# Patient Record
Sex: FEMALE | Race: BLACK OR AFRICAN AMERICAN | NOT HISPANIC OR LATINO | Employment: FULL TIME | URBAN - METROPOLITAN AREA
[De-identification: names, ages, dates, MRNs, and addresses within clinical notes are randomized per-mention and may not be internally consistent; named-entity substitution may affect disease eponyms.]

---

## 2017-05-30 ENCOUNTER — GENERIC CONVERSION - ENCOUNTER (OUTPATIENT)
Dept: OTHER | Facility: OTHER | Age: 52
End: 2017-05-30

## 2020-04-24 ENCOUNTER — OCCMED (OUTPATIENT)
Dept: URGENT CARE | Age: 55
End: 2020-04-24
Payer: OTHER MISCELLANEOUS

## 2020-04-24 ENCOUNTER — APPOINTMENT (OUTPATIENT)
Dept: RADIOLOGY | Age: 55
End: 2020-04-24
Payer: OTHER MISCELLANEOUS
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
## 2020-04-24 PROCEDURE — 72100 X-RAY EXAM L-S SPINE 2/3 VWS: CPT

## 2020-04-24 PROCEDURE — 73080 X-RAY EXAM OF ELBOW: CPT

## 2020-04-24 PROCEDURE — G0382 LEV 3 HOSP TYPE B ED VISIT: HCPCS | Performed by: PHYSICIAN ASSISTANT

## 2020-04-24 PROCEDURE — 73140 X-RAY EXAM OF FINGER(S): CPT

## 2020-04-24 PROCEDURE — 99283 EMERGENCY DEPT VISIT LOW MDM: CPT | Performed by: PHYSICIAN ASSISTANT

## 2020-05-14 ENCOUNTER — APPOINTMENT (OUTPATIENT)
Dept: URGENT CARE | Age: 55
End: 2020-05-14
Payer: OTHER MISCELLANEOUS

## 2020-05-14 PROCEDURE — 99213 OFFICE O/P EST LOW 20 MIN: CPT | Performed by: PHYSICIAN ASSISTANT

## 2020-05-22 ENCOUNTER — APPOINTMENT (OUTPATIENT)
Dept: URGENT CARE | Age: 55
End: 2020-05-22
Payer: OTHER MISCELLANEOUS

## 2020-05-22 PROCEDURE — 99213 OFFICE O/P EST LOW 20 MIN: CPT | Performed by: PHYSICIAN ASSISTANT

## 2020-05-29 ENCOUNTER — APPOINTMENT (OUTPATIENT)
Dept: URGENT CARE | Age: 55
End: 2020-05-29
Payer: OTHER MISCELLANEOUS

## 2020-05-29 PROCEDURE — 99213 OFFICE O/P EST LOW 20 MIN: CPT | Performed by: PHYSICIAN ASSISTANT

## 2022-02-18 ENCOUNTER — APPOINTMENT (OUTPATIENT)
Dept: LAB | Facility: IMAGING CENTER | Age: 57
End: 2022-02-18
Payer: COMMERCIAL

## 2022-02-18 DIAGNOSIS — B35.1 TINEA UNGUIUM: ICD-10-CM

## 2022-02-18 LAB
ALBUMIN SERPL BCP-MCNC: 3.4 G/DL (ref 3.5–5)
ALP SERPL-CCNC: 71 U/L (ref 46–116)
ALT SERPL W P-5'-P-CCNC: 26 U/L (ref 12–78)
AST SERPL W P-5'-P-CCNC: 21 U/L (ref 5–45)
BILIRUB DIRECT SERPL-MCNC: 0.11 MG/DL (ref 0–0.2)
BILIRUB SERPL-MCNC: 0.82 MG/DL (ref 0.2–1)
PROT SERPL-MCNC: 8.1 G/DL (ref 6.4–8.2)

## 2022-02-18 PROCEDURE — 80076 HEPATIC FUNCTION PANEL: CPT

## 2022-02-18 PROCEDURE — 36415 COLL VENOUS BLD VENIPUNCTURE: CPT

## 2022-05-10 ENCOUNTER — HOSPITAL ENCOUNTER (EMERGENCY)
Facility: HOSPITAL | Age: 57
Discharge: HOME | End: 2022-05-10
Attending: EMERGENCY MEDICINE
Payer: COMMERCIAL

## 2022-05-10 VITALS
HEIGHT: 69 IN | DIASTOLIC BLOOD PRESSURE: 85 MMHG | BODY MASS INDEX: 32.98 KG/M2 | WEIGHT: 222.66 LBS | SYSTOLIC BLOOD PRESSURE: 119 MMHG | TEMPERATURE: 97.9 F | HEART RATE: 76 BPM | OXYGEN SATURATION: 96 % | RESPIRATION RATE: 18 BRPM

## 2022-05-10 DIAGNOSIS — R22.0 LEFT FACIAL SWELLING: Primary | ICD-10-CM

## 2022-05-10 PROCEDURE — 99284 EMERGENCY DEPT VISIT MOD MDM: CPT | Performed by: EMERGENCY MEDICINE

## 2022-05-10 PROCEDURE — 99283 EMERGENCY DEPT VISIT LOW MDM: CPT

## 2022-05-10 RX ORDER — NAPROXEN 500 MG/1
500 TABLET ORAL 2 TIMES DAILY WITH MEALS
Qty: 14 TABLET | Refills: 0 | Status: SHIPPED | OUTPATIENT
Start: 2022-05-10 | End: 2022-07-14 | Stop reason: ALTCHOICE

## 2022-05-10 RX ORDER — NAPROXEN 250 MG/1
500 TABLET ORAL ONCE
Status: COMPLETED | OUTPATIENT
Start: 2022-05-10 | End: 2022-05-10

## 2022-05-10 RX ORDER — PENICILLIN V POTASSIUM 500 MG/1
500 TABLET ORAL 4 TIMES DAILY
Qty: 28 TABLET | Refills: 0 | Status: SHIPPED | OUTPATIENT
Start: 2022-05-10 | End: 2022-05-17

## 2022-05-10 RX ORDER — PENICILLIN V POTASSIUM 250 MG/1
500 TABLET ORAL ONCE
Status: COMPLETED | OUTPATIENT
Start: 2022-05-10 | End: 2022-05-10

## 2022-05-10 RX ADMIN — NAPROXEN SODIUM 500 MG: 250 TABLET ORAL at 01:32

## 2022-05-10 RX ADMIN — PENICILLIN V POTASSIUM 500 MG: 250 TABLET, FILM COATED ORAL at 01:39

## 2022-05-15 NOTE — ED PROVIDER NOTES
Pt Name: Aline Desai  MRN: [id number]  [place] 1965  Age/Sex: 55 y o  female  Date of evaluation: 5/10/2022  PCP: No primary care provider on file  CHIEF COMPLAINT    Chief Complaint   Patient presents with    Facial Swelling     left side facial swelling that started Thursday when a box fell on the pt's face         HPI    Julian Devi presents to the Emergency Department complaining of left facial swelling and tenderness  She was seen and evaluated but since then the pain and swelling have gotten worse  No fever  HPI      Past Medical and Surgical History    History reviewed  No pertinent past medical history  Past Surgical History:   Procedure Laterality Date    HERNIA REPAIR         History reviewed  No pertinent family history  Social History     Tobacco Use    Smoking status: Current Every Day Smoker     Packs/day: 0 50     Types: Cigarettes    Smokeless tobacco: Never Used   Vaping Use    Vaping Use: Never used   Substance Use Topics    Alcohol use: Yes     Comment: occasionally     Drug use: Never           Allergies    No Known Allergies    Home Medications    Prior to Admission medications    Medication Sig Start Date End Date Taking? Authorizing Provider   naproxen (Naprosyn) 500 mg tablet Take 1 tablet (500 mg total) by mouth 2 (two) times a day with meals for 7 days 5/10/22 5/17/22  Efraín Alexandra DO   penicillin V potassium (VEETID) 500 mg tablet Take 1 tablet (500 mg total) by mouth 4 (four) times a day for 7 days 5/10/22 5/17/22  Efraín Alexandra DO           Review of Systems    Review of Systems   Constitutional: Negative for chills and fever  HENT: Positive for dental problem and facial swelling  Negative for ear pain, mouth sores, sinus pressure, sinus pain, sore throat, trouble swallowing and voice change  Eyes: Negative for pain and visual disturbance  Respiratory: Negative for cough and shortness of breath      Cardiovascular: Negative for chest pain and palpitations  Gastrointestinal: Negative for abdominal pain and vomiting  Genitourinary: Negative for dysuria and hematuria  Musculoskeletal: Negative for arthralgias and back pain  Skin: Negative for color change and rash  Neurological: Negative for seizures and syncope  All other systems reviewed and are negative  Physical Exam      ED Triage Vitals [05/10/22 0052]   Temperature Pulse Respirations Blood Pressure SpO2   97 9 °F (36 6 °C) 76 18 119/85 96 %      Temp Source Heart Rate Source Patient Position - Orthostatic VS BP Location FiO2 (%)   Oral Monitor Sitting Right arm --      Pain Score       8               Physical Exam  Vitals and nursing note reviewed  Constitutional:       General: She is not in acute distress  Appearance: She is well-developed  HENT:      Head: Normocephalic and atraumatic  Mouth/Throat:      Dentition: Abnormal dentition  Gingival swelling and dental caries present  Comments: Left mandibular pain and swelling  Eyes:      Conjunctiva/sclera: Conjunctivae normal    Cardiovascular:      Rate and Rhythm: Normal rate and regular rhythm  Heart sounds: No murmur heard  Pulmonary:      Effort: Pulmonary effort is normal  No respiratory distress  Breath sounds: Normal breath sounds  Abdominal:      Palpations: Abdomen is soft  Tenderness: There is no abdominal tenderness  Musculoskeletal:      Cervical back: Neck supple  Skin:     General: Skin is warm and dry  Neurological:      Mental Status: She is alert  Assessment and Plan    Sierra Jones is a 55 y o  female who presents with left sided facial swelling  Physical examination remarkable for same  Differential diagnosis (not completely inclusive) includes trauma vs infectious  Plan will be to rx abx and refer for outpatient follow up     MDM    Diagnostic Results    Labs:Procedure    Procedures      ED Course of Care and Re-Assessments      Medications   penicillin V potassium (VEETID) tablet 500 mg (500 mg Oral Given 5/10/22 0139)   naproxen (NAPROSYN) tablet 500 mg (500 mg Oral Given 5/10/22 0132)           FINAL IMPRESSION    Final diagnoses:   Left facial swelling         DISPOSITION/PLAN    Time reflects when diagnosis was documented in both MDM as applicable and the Disposition within this note     Time User Action Codes Description Comment    5/10/2022  1:24 AM Nguyen Rahman Add [R22 0] Left facial swelling       ED Disposition     ED Disposition   Discharge    Condition   Stable    Date/Time   Tue May 10, 2022  1:24 AM    Comment   Kalie Mcfarland discharge to home/self care  Follow-up Information     Follow up With Specialties Details Why [address] [place] [address] for Oral and Maxillofacial Surgery [facility]  Schedule an appointment as soon as possible for a visit   [address] [facility]            PATIENT REFERRED TO:    [address] [facility]  [address]  Schedule an appointment as soon as possible for a visit         DISCHARGE MEDICATIONS:    Discharge Medication List as of 5/10/2022  1:27 AM      START taking these medications    Details   naproxen (Naprosyn) 500 mg tablet Take 1 tablet (500 mg total) by mouth 2 (two) times a day with meals for 7 days, Starting Tue 5/10/2022, Until Tue 5/17/2022, Normal      penicillin V potassium (VEETID) 500 mg tablet Take 1 tablet (500 mg total) by mouth 4 (four) times a day for 7 days, Starting Tue 5/10/2022, Until Tue 5/17/2022, Normal             No discharge procedures on file           Braxton Davies, [address] [place] [address], DO  05/15/22 8801

## 2022-05-26 ENCOUNTER — APPOINTMENT (EMERGENCY)
Dept: RADIOLOGY | Facility: HOSPITAL | Age: 57
End: 2022-05-26
Payer: OTHER MISCELLANEOUS

## 2022-05-26 ENCOUNTER — HOSPITAL ENCOUNTER (EMERGENCY)
Facility: HOSPITAL | Age: 57
Discharge: HOME | End: 2022-05-26
Attending: EMERGENCY MEDICINE
Payer: OTHER MISCELLANEOUS

## 2022-05-26 VITALS
HEART RATE: 77 BPM | WEIGHT: 221.1 LBS | RESPIRATION RATE: 20 BRPM | BODY MASS INDEX: 32.65 KG/M2 | TEMPERATURE: 98.3 F | SYSTOLIC BLOOD PRESSURE: 149 MMHG | OXYGEN SATURATION: 96 % | DIASTOLIC BLOOD PRESSURE: 94 MMHG

## 2022-05-26 DIAGNOSIS — S89.92XA INJURY OF LEFT KNEE, INITIAL ENCOUNTER: Primary | ICD-10-CM

## 2022-05-26 PROCEDURE — 99284 EMERGENCY DEPT VISIT MOD MDM: CPT

## 2022-05-26 PROCEDURE — 73564 X-RAY EXAM KNEE 4 OR MORE: CPT

## 2022-05-26 PROCEDURE — 99283 EMERGENCY DEPT VISIT LOW MDM: CPT

## 2022-05-26 RX ORDER — NAPROXEN 500 MG/1
500 TABLET ORAL 2 TIMES DAILY WITH MEALS
Qty: 10 TABLET | Refills: 0 | Status: SHIPPED | OUTPATIENT
Start: 2022-05-26 | End: 2023-05-26

## 2022-05-26 NOTE — ED PROVIDER NOTES
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 with a table and couple of chairs, when the box fell onto her left knee, slid down to her left foot dropping her between the box and wall  Reports that she tried to twist a way to get out from under the box, tried to pull early gout, unsuccessfully for few minutes  Reports having knee pain since then  She reports yesterday was swollen and there was bruising of her left knee, she reports some improvement of the swelling today  Pain is worse with weight-bearing, ambulation  She applied ice with no improvement  She denies foot or ankle pain, hip pain  Denies previous injury to her knee  History provided by:  Patient  Leg Pain  Location:  Knee  Injury: yes    Knee location:  L knee  Pain details:     Quality:  Throbbing and dull    Radiates to:  L leg  Chronicity:  New  Dislocation: no    Prior injury to area:  No  Relieved by:  Nothing  Worsened by:  Bearing weight, flexion and activity  Ineffective treatments:  Ice, elevation and rest  Associated symptoms: swelling    Associated symptoms: no back pain, no decreased ROM, no fatigue, no fever, no itching, no muscle weakness, no neck pain, no numbness, no stiffness and no tingling    Risk factors: no frequent fractures, no known bone disorder and no recent illness        Prior to Admission Medications   Prescriptions Last Dose Informant Patient Reported?  Taking?   naproxen (Naprosyn) 500 mg tablet   No No   Sig: Take 1 tablet (500 mg total) by mouth 2 (two) times a day with meals for 7 days      Facility-Administered Medications: None History reviewed  No pertinent past medical history  Past Surgical History:   Procedure Laterality Date    HERNIA REPAIR         History reviewed  No pertinent family history  I have reviewed and agree with the history as documented  E-Cigarette/Vaping    E-Cigarette Use Never User      E-Cigarette/Vaping Substances     Social History     Tobacco Use    Smoking status: Current Every Day Smoker     Packs/day: 0 50     Types: Cigarettes    Smokeless tobacco: Never Used   Vaping Use    Vaping Use: Never used   Substance Use Topics    Alcohol use: Yes     Comment: occasionally     Drug use: Never       Review of Systems   Constitutional: Negative for chills, fatigue and fever  Musculoskeletal: Positive for arthralgias and joint swelling  Negative for back pain, neck pain and stiffness  Skin: Positive for color change  Negative for itching and rash  Neurological: Negative for weakness and numbness  All other systems reviewed and are negative  Physical Exam  Physical Exam  Vitals and nursing note reviewed  Constitutional:       General: She is awake  She is not in acute distress  Appearance: Normal appearance  She is not toxic-appearing or diaphoretic  HENT:      Head: Normocephalic and atraumatic  Jaw: No swelling  Right Ear: External ear normal       Left Ear: External ear normal       Mouth/Throat:      Lips: Pink  Mouth: Mucous membranes are moist    Eyes:      General: Lids are normal  Vision grossly intact  Right eye: No discharge  Left eye: No discharge  Conjunctiva/sclera: Conjunctivae normal    Cardiovascular:      Rate and Rhythm: Normal rate and regular rhythm  Heart sounds: Normal heart sounds  Pulmonary:      Effort: Pulmonary effort is normal  No respiratory distress  Breath sounds: Normal breath sounds  Abdominal:      General: There is no distension  Musculoskeletal:      Cervical back: Neck supple        Right hip: Normal       Left hip: Normal       Right knee: Normal       Left knee: Bony tenderness present  No swelling, effusion, erythema, ecchymosis or crepitus  Decreased range of motion  Tenderness present over the medial joint line and lateral joint line  Right lower leg: Normal       Left lower leg: Normal       Right ankle: Normal       Right Achilles Tendon: Normal       Left ankle: Normal       Left Achilles Tendon: Normal       Right foot: Normal       Left foot: Normal  Normal capillary refill  Normal pulse  Comments: LE sensation, strength intact, DTR intact  Skin:     General: Skin is warm and dry  Capillary Refill: Capillary refill takes less than 2 seconds  Coloration: Skin is not jaundiced or pale  Neurological:      Mental Status: She is alert  Gait: Gait normal    Psychiatric:         Mood and Affect: Mood normal          Behavior: Behavior normal          Thought Content: Thought content normal          Vital Signs  ED Triage Vitals [05/26/22 1545]   Temperature Pulse Respirations Blood Pressure SpO2   98 3 °F (36 8 °C) 77 20 149/94 96 %      Temp Source Heart Rate Source Patient Position - Orthostatic VS BP Location FiO2 (%)   Oral Monitor -- -- --      Pain Score       --           Vitals:    05/26/22 1545   BP: 149/94   Pulse: 77         Visual Acuity      ED Medications  Medications - No data to display    Diagnostic Studies  Results Reviewed     None                 XR knee 4+ views left injury   ED Interpretation by Julio C Thomson PA-C (05/26 1635)   No acute fx or dislocation       Final Result by Jennyfer Parham MD (05/26 1640)      No acute osseous abnormality  Workstation performed: [id number]                    Procedures  Procedures         ED Course  ED Course as of 05/27/22 1901   Thu May 26, 2022   1635 Imaging review done by myself and preliminary results were discussed with the patient and family members    Discussion was had with patient and family members that this read is preliminary and therefore discrepancies between this read and the final read by the radiologist are possible  Patient and family members were informed that any discrepancies found by would be relayed by phone call  SBIRT 20yo+    Flowsheet Row Most Recent Value   SBIRT (23 yo +)    In order to provide better care to our patients, we are screening all of our patients for alcohol and drug use  Would it be okay to ask you these screening questions? No Filed at: 05/26/2022 1550                    MDM  Number of Diagnoses or Management Options  Injury of left knee, initial encounter  Diagnosis management comments: Vital signs stable  Afebrile  No signs of acute infection  Strength, sensation, perfusion intact  X-ray ordered  No acute findings  With a follow-up given  Patient placed in knee immobilizer, given crutches  All imaging and/or lab testing discussed with patient, strict return to ED precautions discussed  Patient recommended to follow up promptly with appropriate outpatient provider  Patient and/or family members verbalizes understanding and agrees with plan  Patient and/or family members were given opportunity to ask questions, all questions were answered at this time  Patient is stable for discharge      Portions of the record may have been created with voice recognition software  Occasional wrong word or "sound a like" substitutions may have occurred due to the inherent limitations of voice recognition software  Read the chart carefully and recognize, using context, where substitutions have occurred            Amount and/or Complexity of Data Reviewed  Tests in the radiology section of CPT®: ordered and reviewed        Disposition  Final diagnoses:   Injury of left knee, initial encounter     Time reflects when diagnosis was documented in both MDM as applicable and the Disposition within this note     Time User Action Codes Description Comment    5/26/2022  4:33 PM Nancy Bertrand Add [F13 [id number]] Injury of left knee, initial encounter       ED Disposition     ED Disposition   Discharge    Condition   Stable    Date/Time   Thu May 26, 2022  4:33 PM    Comment   Sri Scott discharge to home/self care  Follow-up Information     Follow up With Specialties Details Why Contact Info Additional [address] [facility] Schedule an appointment as soon as possible for a visit  For follow up regarding your symptoms [address] [facility] [employer] [facility] [place]  [address]  [address] [place] [address], [address] [facility], [address] [place], [place], [address] [phone]          Discharge Medication List as of 5/26/2022  4:37 PM      START taking these medications    Details   naproxen (EC NAPROSYN) 500 MG EC tablet Take 1 tablet (500 mg total) by mouth 2 (two) times a day with meals, Starting Thu 5/26/2022, Until Fri 5/26/2023, Normal         CONTINUE these medications which have NOT CHANGED    Details   naproxen (Naprosyn) 500 mg tablet Take 1 tablet (500 mg total) by mouth 2 (two) times a day with meals for 7 days, Starting Tue 5/10/2022, Until Tue 5/17/2022, Normal             No discharge procedures on file      PDMP Review     None          ED Provider  Electronically Signed by           Kayla Sethi PA-C  05/27/22 7307

## 2022-05-26 NOTE — DISCHARGE INSTRUCTIONS
Follow up with your companies occupational medicine if necessary  Follow up with orthopedics  Use knee immobilizer and crutches until cleared  Return to ED for new or worsening symptoms as discussed

## 2022-05-27 ENCOUNTER — APPOINTMENT (OUTPATIENT)
Dept: URGENT CARE | Facility: MEDICAL CENTER | Age: 57
End: 2022-05-27
Payer: OTHER MISCELLANEOUS

## 2022-05-27 PROCEDURE — 99213 OFFICE O/P EST LOW 20 MIN: CPT | Performed by: EMERGENCY MEDICINE

## 2022-06-01 ENCOUNTER — APPOINTMENT (OUTPATIENT)
Dept: URGENT CARE | Facility: MEDICAL CENTER | Age: 57
End: 2022-06-01
Payer: OTHER MISCELLANEOUS

## 2022-06-01 PROCEDURE — 99213 OFFICE O/P EST LOW 20 MIN: CPT | Performed by: EMERGENCY MEDICINE

## 2022-06-08 ENCOUNTER — APPOINTMENT (OUTPATIENT)
Dept: URGENT CARE | Facility: MEDICAL CENTER | Age: 57
End: 2022-06-08
Payer: OTHER MISCELLANEOUS

## 2022-06-08 PROCEDURE — 99213 OFFICE O/P EST LOW 20 MIN: CPT | Performed by: EMERGENCY MEDICINE

## 2022-07-13 ENCOUNTER — HOSPITAL ENCOUNTER (EMERGENCY)
Facility: HOSPITAL | Age: 57
Discharge: HOME | End: 2022-07-13
Attending: EMERGENCY MEDICINE
Payer: COMMERCIAL

## 2022-07-13 VITALS
RESPIRATION RATE: 18 BRPM | WEIGHT: 227.96 LBS | BODY MASS INDEX: 33.66 KG/M2 | HEART RATE: 97 BPM | DIASTOLIC BLOOD PRESSURE: 83 MMHG | TEMPERATURE: 99 F | OXYGEN SATURATION: 96 % | SYSTOLIC BLOOD PRESSURE: 124 MMHG

## 2022-07-13 DIAGNOSIS — K04.7 DENTAL ABSCESS: Primary | ICD-10-CM

## 2022-07-13 PROCEDURE — 99283 EMERGENCY DEPT VISIT LOW MDM: CPT

## 2022-07-13 PROCEDURE — 99284 EMERGENCY DEPT VISIT MOD MDM: CPT | Performed by: EMERGENCY MEDICINE

## 2022-07-13 RX ORDER — OXYCODONE HYDROCHLORIDE 5 MG/1
5 TABLET ORAL ONCE
Status: COMPLETED | OUTPATIENT
Start: 2022-07-13 | End: 2022-07-13

## 2022-07-13 RX ORDER — CLINDAMYCIN HYDROCHLORIDE 150 MG/1
450 CAPSULE ORAL EVERY 8 HOURS
Qty: 63 CAPSULE | Refills: 0 | Status: SHIPPED | OUTPATIENT
Start: 2022-07-13 | End: 2022-07-20

## 2022-07-13 RX ORDER — CLINDAMYCIN HYDROCHLORIDE 150 MG/1
450 CAPSULE ORAL ONCE
Status: COMPLETED | OUTPATIENT
Start: 2022-07-13 | End: 2022-07-13

## 2022-07-13 RX ORDER — OXYCODONE HYDROCHLORIDE 5 MG/1
5 TABLET ORAL EVERY 6 HOURS PRN
Qty: 12 TABLET | Refills: 0 | Status: SHIPPED | OUTPATIENT
Start: 2022-07-13 | End: 2022-07-16

## 2022-07-13 RX ORDER — IBUPROFEN 600 MG/1
600 TABLET ORAL EVERY 6 HOURS PRN
Qty: 30 TABLET | Refills: 0 | Status: SHIPPED | OUTPATIENT
Start: 2022-07-13

## 2022-07-13 RX ORDER — ACETAMINOPHEN 325 MG/1
650 TABLET ORAL EVERY 6 HOURS PRN
Qty: 30 TABLET | Refills: 0 | Status: SHIPPED | OUTPATIENT
Start: 2022-07-13

## 2022-07-13 RX ORDER — IBUPROFEN 600 MG/1
600 TABLET ORAL ONCE
Status: COMPLETED | OUTPATIENT
Start: 2022-07-13 | End: 2022-07-13

## 2022-07-13 RX ORDER — ACETAMINOPHEN 325 MG/1
650 TABLET ORAL ONCE
Status: COMPLETED | OUTPATIENT
Start: 2022-07-13 | End: 2022-07-13

## 2022-07-13 RX ADMIN — IBUPROFEN 600 MG: 600 TABLET ORAL at 21:01

## 2022-07-13 RX ADMIN — ACETAMINOPHEN 650 MG: 325 TABLET ORAL at 21:01

## 2022-07-13 RX ADMIN — CLINDAMYCIN HYDROCHLORIDE 450 MG: 150 CAPSULE ORAL at 21:01

## 2022-07-13 RX ADMIN — OXYCODONE HYDROCHLORIDE 5 MG: 5 TABLET ORAL at 21:02

## 2022-07-14 NOTE — ED PROVIDER NOTES
History  Chief Complaint   Patient presents with    Dental Pain     Pt reports dental pain and swelling that started on Monday  Is scheduled to see her doctor tomorrow and is having an extraction on the 22nd  Reports using orajel about 10 minutes ago with no relief  50-year-old female with a history of hypertension presenting for evaluation of dental pain and swelling  Symptoms started 2 days ago  Patient notes a constant pain in her left mandibular jaw with radiation to her ear  Patient denies difficulty swallowing or eating  Has not taken any pain medication at home  Has an appointment with her family doctor tomorrow for evaluation and then has a dental extraction scheduled for the 22nd  Patient states she was given antibiotics about a month or 2 ago for the same area  Patient denies fever and other systemic symptoms  Denies chest pain and shortness of breath  Prior to Admission Medications   Prescriptions Last Dose Informant Patient Reported? Taking?   naproxen (EC NAPROSYN) 500 MG EC tablet   No No   Sig: Take 1 tablet (500 mg total) by mouth 2 (two) times a day with meals   naproxen (Naprosyn) 500 mg tablet   No No   Sig: Take 1 tablet (500 mg total) by mouth 2 (two) times a day with meals for 7 days      Facility-Administered Medications: None       Past Medical History:   Diagnosis Date    Hypertension        Past Surgical History:   Procedure Laterality Date    HERNIA REPAIR      HERNIA REPAIR         History reviewed  No pertinent family history  I have reviewed and agree with the history as documented      E-Cigarette/Vaping    E-Cigarette Use Never User      E-Cigarette/Vaping Substances     Social History     Tobacco Use    Smoking status: Current Every Day Smoker     Packs/day: 0 25     Types: Cigarettes    Smokeless tobacco: Never Used   Vaping Use    Vaping Use: Never used   Substance Use Topics    Alcohol use: Yes     Comment: occasionally     Drug use: Never Review of Systems   Constitutional: Negative for chills and fever  HENT: Positive for dental problem and facial swelling  Negative for congestion, ear pain, rhinorrhea, sore throat, trouble swallowing and voice change  Eyes: Negative for pain, discharge and visual disturbance  Respiratory: Negative for cough and shortness of breath  Cardiovascular: Negative for chest pain, palpitations and leg swelling  Gastrointestinal: Negative for abdominal pain, diarrhea, nausea and vomiting  Musculoskeletal: Negative for arthralgias and myalgias  Skin: Negative for color change and rash  Neurological: Negative for weakness, light-headedness and headaches  Hematological: Does not bruise/bleed easily  Physical Exam  Physical Exam  Vitals and nursing note reviewed  Constitutional:       General: She is not in acute distress  Appearance: Normal appearance  HENT:      Head: Normocephalic and atraumatic  Jaw: Tenderness (left mandibular) and swelling (left mandibular) present  No trismus or malocclusion  Nose: Nose normal       Mouth/Throat:      Mouth: Mucous membranes are moist       Dentition: Abnormal dentition  Dental caries and dental abscesses (left mandibular) present  Tongue: No lesions  Tongue does not deviate from midline  Palate: No mass and lesions  Pharynx: Uvula midline  No pharyngeal swelling, oropharyngeal exudate, posterior oropharyngeal erythema or uvula swelling  Tonsils: No tonsillar exudate or tonsillar abscesses  Comments: No base of tongue elevation  Eyes:      General: No scleral icterus  Extraocular Movements: Extraocular movements intact  Conjunctiva/sclera: Conjunctivae normal       Pupils: Pupils are equal, round, and reactive to light  Cardiovascular:      Rate and Rhythm: Normal rate and regular rhythm  Pulmonary:      Effort: Pulmonary effort is normal  No respiratory distress  Breath sounds:  No wheezing, rhonchi or rales  Abdominal:      General: There is no distension  Palpations: Abdomen is soft  Tenderness: There is no abdominal tenderness  There is no guarding or rebound  Musculoskeletal:         General: No deformity  Cervical back: Neck supple  No rigidity or tenderness  Lymphadenopathy:      Cervical: No cervical adenopathy  Skin:     General: Skin is warm and dry  Findings: No rash  Neurological:      General: No focal deficit present  Mental Status: She is alert and oriented to person, place, and time  Mental status is at baseline  Cranial Nerves: No cranial nerve deficit  Psychiatric:         Mood and Affect: Mood normal          Behavior: Behavior normal          Vital Signs  ED Triage Vitals [07/13/22 2023]   Temperature Pulse Respirations Blood Pressure SpO2   99 °F (37 2 °C) 97 18 124/83 96 %      Temp Source Heart Rate Source Patient Position - Orthostatic VS BP Location FiO2 (%)   Oral Monitor -- -- --      Pain Score       10 - Worst Possible Pain           Vitals:    07/13/22 2023   BP: 124/83   Pulse: 97         Visual Acuity      ED Medications  Medications   clindamycin (CLEOCIN) capsule 450 mg (450 mg Oral Given 7/13/22 2101)   oxyCODONE (ROXICODONE) IR tablet 5 mg (5 mg Oral Given 7/13/22 2102)   acetaminophen (TYLENOL) tablet 650 mg (650 mg Oral Given 7/13/22 2101)   ibuprofen (MOTRIN) tablet 600 mg (600 mg Oral Given 7/13/22 2101)       Diagnostic Studies  Results Reviewed     None                 No orders to display              Procedures  Procedures         ED Course                               SBIRT 20yo+    Flowsheet Row Most Recent Value   SBIRT (23 yo +)    In order to provide better care to our patients, we are screening all of our patients for alcohol and drug use  Would it be okay to ask you these screening questions? Yes Filed at: 07/13/2022 2100   Initial Alcohol Screen: US AUDIT-C     1   How often do you have a drink containing alcohol? 0 Filed at: 07/13/2022 2100   2  How many drinks containing alcohol do you have on a typical day you are drinking? 0 Filed at: 07/13/2022 2100   3a  Male UNDER 65: How often do you have five or more drinks on one occasion? 0 Filed at: 07/13/2022 2100   3b  FEMALE Any Age, or MALE 65+: How often do you have 4 or more drinks on one occassion? 0 Filed at: 07/13/2022 2100   Audit-C Score 0 Filed at: 07/13/2022 2100   ESE: How many times in the past year have you    Used an illegal drug or used a prescription medication for non-medical reasons? Never Filed at: 07/13/2022 2100                    MDM  Number of Diagnoses or Management Options  Dental abscess  Diagnosis management comments: 62-year-old female presenting for evaluation of dental pain and swelling  Physical examination is consistent with dental abscess  No base of tongue elevation to suggest deep space abscess or Reagan's angina  No submandibular fullness or tenderness  No cervical lymphadenopathy  No stridor or difficulty swallowing  Minimal facial swelling is present  Patient given ibuprofen and acetaminophen and counseled on use of these medications at home  Will give short course of oxycodone to use as needed for severe pain  Patient counseled extensively on the use of this medication at home and cautioned against driving, operating heavy machinery, and concomitant use of alcohol with this medication  Will initiate antibiotics with clindamycin given recent treatment penicillin and recurrence of infection  Strongly encouraged follow-up with dentist   Return precautions discussed  Patient is in agreement and understanding of these instructions        Disposition  Final diagnoses:   Dental abscess     Time reflects when diagnosis was documented in both MDM as applicable and the Disposition within this note     Time User Action Codes Description Comment    7/13/2022  8:49 PM Kayla Mclain Add [K04 7] Dental abscess       ED Disposition     ED Disposition   Discharge    Condition   Stable    Date/Time   Wed Jul 13, 2022  8:49 PM    Mohan Tejada discharge to home/self care  Follow-up Information     Follow up With Specialties Details Why Contact Info Additional [address] [facility]  As needed [address] [id number] [address] [facility] [address] [place], [id number], [phone]          Discharge Medication List as of 7/13/2022  8:52 PM      START taking these medications    Details   acetaminophen (TYLENOL) 325 mg tablet Take 2 tablets (650 mg total) by mouth every 6 (six) hours as needed for moderate pain, Starting Wed 7/13/2022, Normal      clindamycin (CLEOCIN) 150 mg capsule Take 3 capsules (450 mg total) by mouth every 8 (eight) hours for 7 days, Starting Wed 7/13/2022, Until Wed 7/20/2022, Normal      ibuprofen (MOTRIN) 600 mg tablet Take 1 tablet (600 mg total) by mouth every 6 (six) hours as needed for mild pain, Starting Wed 7/13/2022, Normal      oxyCODONE (Roxicodone) 5 immediate release tablet Take 1 tablet (5 mg total) by mouth every 6 (six) hours as needed for severe pain for up to 3 days Max Daily Amount: 20 mg, Starting Wed 7/13/2022, Until Sat 7/16/2022 at 2359, Normal         CONTINUE these medications which have NOT CHANGED    Details   naproxen (EC NAPROSYN) 500 MG EC tablet Take 1 tablet (500 mg total) by mouth 2 (two) times a day with meals, Starting Thu 5/26/2022, Until Fri 5/26/2023, Normal      naproxen (Naprosyn) 500 mg tablet Take 1 tablet (500 mg total) by mouth 2 (two) times a day with meals for 7 days, Starting Tue 5/10/2022, Until Tue 5/17/2022, Normal             No discharge procedures on file      PDMP Review       Value Time User    PDMP Reviewed  Yes 7/13/2022  9:12 PM Kishor Jung MD          ED Provider  Electronically Signed by           Emily Curran Maicol Maguire MD  07/13/22 3584

## 2022-09-16 ENCOUNTER — APPOINTMENT (OUTPATIENT)
Dept: RADIOLOGY | Facility: MEDICAL CENTER | Age: 57
End: 2022-09-16
Payer: MEDICARE

## 2022-09-16 ENCOUNTER — OCCMED (OUTPATIENT)
Dept: URGENT CARE | Facility: MEDICAL CENTER | Age: 57
End: 2022-09-16
Payer: OTHER MISCELLANEOUS

## 2022-09-16 DIAGNOSIS — S80.02XA CONTUSION OF LEFT KNEE, INITIAL ENCOUNTER: ICD-10-CM

## 2022-09-16 DIAGNOSIS — S80.01XA CONTUSION OF RIGHT KNEE, INITIAL ENCOUNTER: ICD-10-CM

## 2022-09-16 DIAGNOSIS — S62.102A CLOSED FRACTURE OF LEFT WRIST, INITIAL ENCOUNTER: Primary | ICD-10-CM

## 2022-09-16 DIAGNOSIS — S63.501A SPRAIN OF RIGHT WRIST, INITIAL ENCOUNTER: ICD-10-CM

## 2022-09-16 DIAGNOSIS — S89.90XA KNEE INJURY, UNSPECIFIED LATERALITY, INITIAL ENCOUNTER: ICD-10-CM

## 2022-09-16 DIAGNOSIS — S69.90XA INJURY OF WRIST, UNSPECIFIED LATERALITY, INITIAL ENCOUNTER: ICD-10-CM

## 2022-09-16 PROCEDURE — 73564 X-RAY EXAM KNEE 4 OR MORE: CPT

## 2022-09-16 PROCEDURE — 29125 APPL SHORT ARM SPLINT STATIC: CPT | Performed by: PHYSICIAN ASSISTANT

## 2022-09-16 PROCEDURE — 73110 X-RAY EXAM OF WRIST: CPT

## 2022-09-16 PROCEDURE — 99283 EMERGENCY DEPT VISIT LOW MDM: CPT | Performed by: PHYSICIAN ASSISTANT

## 2022-09-16 PROCEDURE — G0382 LEV 3 HOSP TYPE B ED VISIT: HCPCS | Performed by: PHYSICIAN ASSISTANT

## 2022-09-16 RX ORDER — ACETAMINOPHEN AND CODEINE PHOSPHATE 300; 30 MG/1; MG/1
1 TABLET ORAL EVERY 4 HOURS PRN
Qty: 15 TABLET | Refills: 0 | Status: SHIPPED | OUTPATIENT
Start: 2022-09-16

## 2022-09-21 ENCOUNTER — OFFICE VISIT (OUTPATIENT)
Dept: OBGYN CLINIC | Facility: CLINIC | Age: 57
End: 2022-09-21

## 2022-09-21 VITALS
HEART RATE: 97 BPM | SYSTOLIC BLOOD PRESSURE: 121 MMHG | HEIGHT: 69 IN | DIASTOLIC BLOOD PRESSURE: 84 MMHG | WEIGHT: 234.8 LBS | BODY MASS INDEX: 34.78 KG/M2

## 2022-09-21 DIAGNOSIS — S52.502A CLOSED FRACTURE OF DISTAL END OF LEFT RADIUS, UNSPECIFIED FRACTURE MORPHOLOGY, INITIAL ENCOUNTER: Primary | ICD-10-CM

## 2022-09-21 PROCEDURE — 99204 OFFICE O/P NEW MOD 45 MIN: CPT | Performed by: STUDENT IN AN ORGANIZED HEALTH CARE EDUCATION/TRAINING PROGRAM

## 2022-09-21 NOTE — LETTER
September 21, 2022     Patient: Chema Brito  YOB: 1965  Date of Visit: 9/21/2022      To Whom it May Concern:    [employer] Branchfatmata Howardhty is under my professional care  Chuck Plan was seen in my office on 9/21/2022  Chuck Plan may return to work with limitations No lifting over 5 lb with the left arm  We will re-evaluate her left arm in 4 weeks  If you have any questions or concerns, please don't hesitate to call  Sincerely,          Cathy Puga DO        CC: Ade Elizabeth

## 2022-09-21 NOTE — PROGRESS NOTES
Ortho Sports Medicine New Patient Hand/Wrist Visit     Assesment:   65 y o  female left nondisplaced distal radius fracture     Plan:    The patient's diagnosis and treatment were discussed at length today  We discussed no treatment, non-operative treatment, and operative treatment  Short-arm cast was applied today with padding of all bony prominences  See back in 4 weeks with repeat x-rays  At that time fracture appears to be healing we can DC the cast transition to a removable splint  Provided a work note with 5 lb weight lifting restriction  Conservative treatment:  Short arm cast provided  Cast care instructions provided  Instructed to wear a short-arm cast for 4-6 weeks  Lifting restrictions of 5 lbs  Work note provided  NSAIDs as needed for pain  Follow up in one month for x ray to evaluate healing in cast       Imaging: All imaging from today was reviewed by myself and explained to the patient  Nondisplaced distal radius fracture seen on x-ray  Injection:    No Injection planned at this time  Surgery:     No surgery is recommended at this point, continue with conservative treatment plan as noted  Follow up:    Return in about 4 weeks (around 10/19/2022) for assess healing of distal radius fracture   Chief Complaint   Patient presents with    Left Wrist - Pain       History of Present Illness: The patient is a 65 y o , right hand dominant female whose occupation is , referred to me by their primary care physician, seen in clinic for consultation of left hand/wrist   She had a fall on September 16, 2022  She caught herself made fall and immediately started having pain of the left forearm  Her primary care provider ordered x-rays of the left forearm  X-rays of the left forearm showed a distal radial fracture  A splint was applied and she has been wearing the splint since    She describes the pain as a 10/10 with some radiation to the middle forearm  She describes pain as sharp put and dull  She denies using NSAIDs for pain relief  The pain is mainly located on the posterior aspect of the lateral distal forearm  Pain is improved with rest and a splint  Hand/wrist Surgical History:  None    Past Medical, Social and Family History:  Past Medical History:   Diagnosis Date    Hypertension      Past Surgical History:   Procedure Laterality Date    HERNIA REPAIR      HERNIA REPAIR       No Known Allergies  Current Outpatient Medications on File Prior to Visit   Medication Sig Dispense Refill    acetaminophen-codeine (TYLENOL #3) 300-30 mg per tablet Take 1 tablet by mouth every 4 (four) hours as needed for moderate pain 15 tablet 0    acetaminophen (TYLENOL) 325 mg tablet Take 2 tablets (650 mg total) by mouth every 6 (six) hours as needed for moderate pain (Patient not taking: Reported on 9/21/2022) 30 tablet 0    ibuprofen (MOTRIN) 600 mg tablet Take 1 tablet (600 mg total) by mouth every 6 (six) hours as needed for mild pain (Patient not taking: Reported on 9/21/2022) 30 tablet 0    naproxen (EC NAPROSYN) 500 MG EC tablet Take 1 tablet (500 mg total) by mouth 2 (two) times a day with meals (Patient not taking: Reported on 9/21/2022) 10 tablet 0     No current facility-administered medications on file prior to visit       Social History     Socioeconomic History    Marital status: Single     Spouse name: Not on file    Number of children: Not on file    Years of education: Not on file    Highest education level: Not on file   Occupational History    Not on file   Tobacco Use    Smoking status: Current Every Day Smoker     Packs/day: 0 25     Types: Cigarettes    Smokeless tobacco: Never Used   Vaping Use    Vaping Use: Never used   Substance and Sexual Activity    Alcohol use: Yes     Comment: occasionally     Drug use: Never    Sexual activity: Not on file   Other Topics Concern    Not on file   Social History Narrative    Not on file     Social Determinants of Health     Financial Resource Strain: Not on file   Food Insecurity: Not on file   Transportation Needs: Not on file   Physical Activity: Not on file   Stress: Not on file   Social Connections: Not on file   Intimate Partner Violence: Not on file   Housing Stability: Not on file         I have reviewed the past medical, surgical, social and family history, medications and allergies as documented in the EMR  Review of systems: ROS is negative other than that noted in the HPI  Constitutional: Negative for fatigue and fever  HENT: Negative for sore throat  Respiratory: Negative for shortness of breath  Cardiovascular: Negative for chest pain  Gastrointestinal: Negative for abdominal pain  Endocrine: Negative for cold intolerance and heat intolerance  Genitourinary: Negative for flank pain  Musculoskeletal: Negative for back pain  Skin: Negative for rash  Allergic/Immunologic: Negative for immunocompromised state  Neurological: Negative for dizziness  Psychiatric/Behavioral: Negative for agitation  Physical Exam:    Blood pressure 121/84, pulse 97, height 5' 9" (1 753 m), weight 107 kg (234 lb 12 8 oz)  General/Constitutional: NAD, well developed, well nourished  HENT: Normocephalic, atraumatic  CV: Intact distal pulses, regular rate  Resp: No respiratory distress or labored breathing  Lymphatic: No lymphadenopathy palpated  Neuro: Alert and Oriented x 3, no focal deficits  Psych: Normal mood, normal affect, normal judgement, normal behavior  Skin: Warm, dry, no rashes, no erythema      Wrist/Hand focused exam:               LEFT   ROM:   Impaired range of motion at the wrist due to pain  Able to flex and extend fingers  Able to flex 135 and extend elbow 0  Palpation: Effusion negative     Tenderness Tenderness to distal posterior lateral aspect of the radius  Tender to palpation at the TFCC but ulnar shuck is negative     No tenderness to the scaphoid, carpals, metacarpals, proximal radius, ulna, distal humerus  Instability:  stable         UE NV Exam: +2 Radial pulses   Sensation intact to light touch C5-T1 bilaterally, Radial/median/ulnar nerve motor intact      Cervical ROM is full without pain, numbness or tingling        Hand/Wrist Imaging:    X-rays of the left hand/wrist were reviewed, which demonstrate nondisplaced distal radius fracture  No displaced fractures are appreciated  No disruption of the DRUJ  I have reviewed the radiology report and agree with their impression          Scribe Attestation    I,:  Rex Crabtree PA-C am acting as a scribe while in the presence of the attending physician :       I,:   personally performed the services described in this documentation    as scribed in my presence :

## 2022-09-21 NOTE — LETTER
September 29, 2022     Patient: Griffin Gonzalez  YOB: 1965  Date of Visit: 9/21/2022      To Whom it May Concern:    [employer] is under my professional care  Wanda Mullen was seen in my office on 9/21/2022  Wanda Mullen may return to work with limitations limit amount of time with arm in dependent position  Avoid any weight bearing > 10 lbs with the affected arm and prioritize activities to the non-affected arm       If you have any questions or concerns, please don't hesitate to call  Sincerely,          Ed Colon DO        CC: Ade Jolley

## 2022-09-29 ENCOUNTER — TELEPHONE (OUTPATIENT)
Dept: OBGYN CLINIC | Facility: HOSPITAL | Age: 57
End: 2022-09-29

## 2022-09-29 NOTE — TELEPHONE ENCOUNTER
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 work, she holds a clip board with the affected hand and her fingers swell and she has a hard time holding the clip board  She has good cap refill  Making Left hand turns while driving is a problem, she cannot manuver  Advised that she needs to get the arm elevated periodically at work and flex and extend fingers several times per hour  If this does not help she should call back for cast check  Please advise    Patient will find out from her employer if there is some other duties for her today that will not require her to have her arm dependent for long period of time  She will call back if a different job can be provided,  Can a note with ability for frequent elevation be provided? Please fax note to Leticia Fuchs [phone]

## 2022-09-29 NOTE — TELEPHONE ENCOUNTER
Patient reports when it is cold outside her arm swells and cast hurts also hard to drive  Please return call to discuss   Thanks

## 2022-09-30 NOTE — TELEPHONE ENCOUNTER
Patient aware  She is having problems with intermittent swelling that cast feels too tight yet too loose  Requesting Cast check  Offered appt for today but patient cannot make it  10/5 appt provided  Will call with worsening of symptoms

## 2022-10-05 ENCOUNTER — APPOINTMENT (OUTPATIENT)
Dept: RADIOLOGY | Facility: AMBULARY SURGERY CENTER | Age: 57
End: 2022-10-05
Attending: STUDENT IN AN ORGANIZED HEALTH CARE EDUCATION/TRAINING PROGRAM
Payer: MEDICARE

## 2022-10-05 ENCOUNTER — OFFICE VISIT (OUTPATIENT)
Dept: OBGYN CLINIC | Facility: CLINIC | Age: 57
End: 2022-10-05

## 2022-10-05 VITALS
HEART RATE: 78 BPM | HEIGHT: 69 IN | SYSTOLIC BLOOD PRESSURE: 114 MMHG | WEIGHT: 234 LBS | DIASTOLIC BLOOD PRESSURE: 80 MMHG | BODY MASS INDEX: 34.66 KG/M2

## 2022-10-05 DIAGNOSIS — S52.502A CLOSED FRACTURE OF DISTAL END OF LEFT RADIUS, UNSPECIFIED FRACTURE MORPHOLOGY, INITIAL ENCOUNTER: ICD-10-CM

## 2022-10-05 DIAGNOSIS — S52.502A CLOSED FRACTURE OF DISTAL END OF LEFT RADIUS, UNSPECIFIED FRACTURE MORPHOLOGY, INITIAL ENCOUNTER: Primary | ICD-10-CM

## 2022-10-05 PROCEDURE — 99214 OFFICE O/P EST MOD 30 MIN: CPT | Performed by: STUDENT IN AN ORGANIZED HEALTH CARE EDUCATION/TRAINING PROGRAM

## 2022-10-05 PROCEDURE — 73110 X-RAY EXAM OF WRIST: CPT

## 2022-10-05 RX ORDER — AMLODIPINE BESYLATE 2.5 MG/1
2.5 TABLET ORAL DAILY
COMMUNITY
Start: 2022-01-04 | End: 2023-01-04

## 2022-10-05 NOTE — PROGRESS NOTES
Ortho Sports Medicine New Patient Hand/Wrist Visit     Assesment:   65 y o  female left healing nondisplaced distal radius fracture     Plan:    The patient's diagnosis and treatment were discussed at length today  We discussed no treatment, non-operative treatment, and operative treatment  Patient exam findings are consistent with healing nondisplaced distal radius fracture  Given her increased swelling and discomfort with her cast we did remove it at today's visit and repeated x-rays for re-evaluation of distal radius fracture  Given that the fracture showed appropriate healing and no signs of movement we did perform a new short-arm cast at today's visit  She was instructed on proper cast care guidance  She is instructed to continue use for another 2 weeks at which time we will remove the cast, repeat x-rays and at that time transition to wrist splint  Conservative treatment:  New Short arm cast provided  Cast care instructions provided  Instructed to wear a short-arm cast for 4-6 weeks  Lifting restrictions of 5 lbs  Work note provided  NSAIDs as needed for pain  Imaging: All imaging from today was reviewed by myself and explained to the patient  Healing nondisplaced distal radius fracture seen on x-ray  Injection:    No Injection planned at this time  Surgery:     No surgery is recommended at this point, continue with conservative treatment plan as noted  Follow up: Follow-up in 2 weeks for re-evaluation and x-ray of left wrist 3+ views out of cast         Chief Complaint   Patient presents with    Left Wrist - Pain       History of Present Illness: The patient is a 65 y o , right hand dominant female whose occupation is , presents today for follow-up evaluation left nondisplaced distal radius fracture sustained on 09/16/2022    She states over the last week or so her cast has progressively felt tighter and occasionally cause numbness and tingling in her fingers  She states after her cast was originally placed on it felt lose as if it was going to fall off, but then got tight around her wrist   She denies anyone specific activity causing increased tightness in her wrist   She has attempted elevation and ice to provide her relief, however is nothing improves her symptoms  She states that she has been compliant with proper care of her cast   She denies any new injury or trauma to her wrist         Hand/wrist Surgical History:  None    Past Medical, Social and Family History:  Past Medical History:   Diagnosis Date    Hypertension      Past Surgical History:   Procedure Laterality Date    HERNIA REPAIR      HERNIA REPAIR       No Known Allergies  Current Outpatient Medications on File Prior to Visit   Medication Sig Dispense Refill    acetaminophen-codeine (TYLENOL #3) 300-30 mg per tablet Take 1 tablet by mouth every 4 (four) hours as needed for moderate pain 15 tablet 0    acetaminophen (TYLENOL) 325 mg tablet Take 2 tablets (650 mg total) by mouth every 6 (six) hours as needed for moderate pain (Patient not taking: Reported on 9/21/2022) 30 tablet 0    ibuprofen (MOTRIN) 600 mg tablet Take 1 tablet (600 mg total) by mouth every 6 (six) hours as needed for mild pain (Patient not taking: Reported on 9/21/2022) 30 tablet 0    naproxen (EC NAPROSYN) 500 MG EC tablet Take 1 tablet (500 mg total) by mouth 2 (two) times a day with meals (Patient not taking: Reported on 9/21/2022) 10 tablet 0     No current facility-administered medications on file prior to visit       Social History     Socioeconomic History    Marital status: Single     Spouse name: Not on file    Number of children: Not on file    Years of education: Not on file    Highest education level: Not on file   Occupational History    Not on file   Tobacco Use    Smoking status: Current Every Day Smoker     Packs/day: 0 25     Types: Cigarettes    Smokeless tobacco: Never Used   Vaping Use  Vaping Use: Never used   Substance and Sexual Activity    Alcohol use: Yes     Comment: occasionally     Drug use: Never    Sexual activity: Not on file   Other Topics Concern    Not on file   Social History Narrative    Not on file     Social Determinants of Health     Financial Resource Strain: Not on file   Food Insecurity: Not on file   Transportation Needs: Not on file   Physical Activity: Not on file   Stress: Not on file   Social Connections: Not on file   Intimate Partner Violence: Not on file   Housing Stability: Not on file         I have reviewed the past medical, surgical, social and family history, medications and allergies as documented in the EMR  Review of systems: ROS is negative other than that noted in the HPI  Constitutional: Negative for fatigue and fever  HENT: Negative for sore throat  Respiratory: Negative for shortness of breath  Cardiovascular: Negative for chest pain  Gastrointestinal: Negative for abdominal pain  Endocrine: Negative for cold intolerance and heat intolerance  Genitourinary: Negative for flank pain  Musculoskeletal: Negative for back pain  Skin: Negative for rash  Allergic/Immunologic: Negative for immunocompromised state  Neurological: Negative for dizziness  Psychiatric/Behavioral: Negative for agitation  Physical Exam:    Blood pressure 121/84, pulse 97, height 5' 9" (1 753 m), weight 107 kg (234 lb 12 8 oz)  General/Constitutional: NAD, well developed, well nourished  HENT: Normocephalic, atraumatic  CV: Intact distal pulses, regular rate  Resp: No respiratory distress or labored breathing  Lymphatic: No lymphadenopathy palpated  Neuro: Alert and Oriented x 3, no focal deficits  Psych: Normal mood, normal affect, normal judgement, normal behavior  Skin: Warm, dry, no rashes, no erythema      Wrist/Hand focused exam:              LEFT   ROM:   Impaired range of motion at the wrist due to pain  Able to flex and extend fingers  Able to flex 135 and extend elbow 0  Palpation: Effusion negative     Tenderness Tenderness to distal posterior lateral aspect of the radius  Tender to palpation at the TFCC but ulnar shuck is negative  No tenderness to the scaphoid, carpals, metacarpals, proximal radius, ulna, distal humerus  Instability:  stable         UE NV Exam: +2 Radial pulses   Sensation intact to light touch C5-T1 bilaterally, Radial/median/ulnar nerve motor intact      Cervical ROM is full without pain, numbness or tingling        Hand/Wrist Imaging:    X-rays of the left hand/wrist performed on 10/05/2022 were reviewed, which demonstrate healing nondisplaced distal radius fracture    I do not have a current reading interpretation from radiology        Scribe Attestation    I,:  Floresita Ludwig am acting as a scribe while in the presence of the attending physician :       I,:  Darius Pereyra, DO personally performed the services described in this documentation    as scribed in my presence :

## 2022-10-17 ENCOUNTER — TELEPHONE (OUTPATIENT)
Dept: OBGYN CLINIC | Facility: HOSPITAL | Age: 57
End: 2022-10-17

## 2022-10-17 NOTE — TELEPHONE ENCOUNTER
Caller: Helen Sosa    Doctor: Dr Meghan Young    Reason for call: Patient is asking for a Lyft ride to and from her appt on 10/19 @ 9:45am   Please advise    Call back#: [phone]

## 2022-10-19 ENCOUNTER — OFFICE VISIT (OUTPATIENT)
Dept: OBGYN CLINIC | Facility: CLINIC | Age: 57
End: 2022-10-19
Payer: OTHER MISCELLANEOUS

## 2022-10-19 ENCOUNTER — APPOINTMENT (OUTPATIENT)
Dept: RADIOLOGY | Facility: AMBULARY SURGERY CENTER | Age: 57
End: 2022-10-19
Attending: STUDENT IN AN ORGANIZED HEALTH CARE EDUCATION/TRAINING PROGRAM
Payer: MEDICARE

## 2022-10-19 VITALS
DIASTOLIC BLOOD PRESSURE: 93 MMHG | BODY MASS INDEX: 34.56 KG/M2 | SYSTOLIC BLOOD PRESSURE: 134 MMHG | WEIGHT: 234 LBS | HEART RATE: 77 BPM

## 2022-10-19 DIAGNOSIS — S52.502A CLOSED FRACTURE OF DISTAL END OF LEFT RADIUS, UNSPECIFIED FRACTURE MORPHOLOGY, INITIAL ENCOUNTER: ICD-10-CM

## 2022-10-19 DIAGNOSIS — M25.532 PAIN IN LEFT WRIST: ICD-10-CM

## 2022-10-19 DIAGNOSIS — S52.502A CLOSED FRACTURE OF DISTAL END OF LEFT RADIUS, UNSPECIFIED FRACTURE MORPHOLOGY, INITIAL ENCOUNTER: Primary | ICD-10-CM

## 2022-10-19 PROCEDURE — 73110 X-RAY EXAM OF WRIST: CPT

## 2022-10-19 PROCEDURE — 99213 OFFICE O/P EST LOW 20 MIN: CPT | Performed by: STUDENT IN AN ORGANIZED HEALTH CARE EDUCATION/TRAINING PROGRAM

## 2022-10-19 NOTE — PROGRESS NOTES
Ortho Sports Medicine Follow-up Hand/Wrist Visit     Assesment:   65 y o  female left distal radius fracture, routine healing  Plan:    The patient's diagnosis and treatment were discussed at length today  We discussed no treatment, non-operative treatment, and operative treatment  Her cast was discontinued today  She still has some tenderness to palpation at the distal radius and radiographic evidence of routine healing, however visible fracture line  I placed her in a removable wrist brace at this time in advise continued nonweightbearing to that hand  Regarding her work, I have asked her she can continue to work as her job is  as she is mostly doing standing, holding a clipboard, 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  Advised her to avoid any significant lifting, grasping or grabbing, or push pulling  I provided a hand therapy referral today to begin in 2 weeks time  I also advised her to do some gentle home exercises by doing the alphabet with her wrist   I also advised over-the-counter calcium and vitamin-D supplementation  I will see her back in 4 6 weeks with repeat x-rays for re-evaluation  Conservative treatment:    Ice to hand/wrist 1-2 times daily, for 20 minutes at a time  Wrist brace given today  Told to continue avoiding bearing weight  Advised to take calcium supplementation to help bone healing  Advised to begin gentle wrist motion to improve range of motion  Imaging: All imaging from today was reviewed by myself and explained to the patient  Injection:    No Injection planned at this time  Surgery:     No surgery is recommended at this point, continue with conservative treatment plan as noted  Follow up:    No follow-ups on file  Chief Complaint   Patient presents with   • Left Wrist - Follow-up       History of Present Illness:     The patient is a 65 y o , right hand dominant female whose occupation is , is seen for follow-up of left distal radius fracture  Since her last visit she feels minimal improvement of her symptoms  She was given a short-arm cast at her last visit  The cast was removed today  She states that the pain is produced with flexion/extension of the wrist, supination of the wrist   Since her last visit she explains that she had another fall while she was at work  There was a situation where she was hurrying towards the bathroom and slipped on wet grass and she caught herself from the fall and landed on her hands and feet  She was in the cast at that time and reported no significant change in her pain after the fall  Hand/wrist Surgical History:  None    Past Medical, Social and Family History:  Past Medical History:   Diagnosis Date   • Hypertension      Past Surgical History:   Procedure Laterality Date   • HERNIA REPAIR     • HERNIA REPAIR       No Known Allergies  Current Outpatient Medications on File Prior to Visit   Medication Sig Dispense Refill   • acetaminophen-codeine (TYLENOL #3) 300-30 mg per tablet Take 1 tablet by mouth every 4 (four) hours as needed for moderate pain 15 tablet 0   • amLODIPine (NORVASC) 2 5 mg tablet Take 2 5 mg by mouth daily     • acetaminophen (TYLENOL) 325 mg tablet Take 2 tablets (650 mg total) by mouth every 6 (six) hours as needed for moderate pain (Patient not taking: No sig reported) 30 tablet 0   • ibuprofen (MOTRIN) 600 mg tablet Take 1 tablet (600 mg total) by mouth every 6 (six) hours as needed for mild pain (Patient not taking: No sig reported) 30 tablet 0   • naproxen (EC NAPROSYN) 500 MG EC tablet Take 1 tablet (500 mg total) by mouth 2 (two) times a day with meals (Patient not taking: No sig reported) 10 tablet 0     No current facility-administered medications on file prior to visit       Social History     Socioeconomic History   • Marital status: Single     Spouse name: Not on file   • Number of children: Not on file   • Years of education: Not on file   • Highest education level: Not on file   Occupational History   • Not on file   Tobacco Use   • Smoking status: Current Every Day Smoker     Packs/day: 0 25     Types: Cigarettes   • Smokeless tobacco: Never Used   Vaping Use   • Vaping Use: Never used   Substance and Sexual Activity   • Alcohol use: Yes     Comment: occasionally    • Drug use: Never   • Sexual activity: Not on file   Other Topics Concern   • Not on file   Social History Narrative   • Not on file     Social Determinants of Health     Financial Resource Strain: Not on file   Food Insecurity: Not on file   Transportation Needs: Not on file   Physical Activity: Not on file   Stress: Not on file   Social Connections: Not on file   Intimate Partner Violence: Not on file   Housing Stability: Not on file         I have reviewed the past medical, surgical, social and family history, medications and allergies as documented in the EMR  Review of systems: ROS is negative other than that noted in the HPI  Constitutional: Negative for fatigue and fever  HENT: Negative for sore throat  Respiratory: Negative for shortness of breath  Cardiovascular: Negative for chest pain  Gastrointestinal: Negative for abdominal pain  Endocrine: Negative for cold intolerance and heat intolerance  Genitourinary: Negative for flank pain  Musculoskeletal: Negative for back pain  Skin: Negative for rash  Allergic/Immunologic: Negative for immunocompromised state  Neurological: Negative for dizziness  Psychiatric/Behavioral: Negative for agitation  Physical Exam:    Blood pressure 134/93, pulse 77, weight 106 kg (234 lb)      General/Constitutional: NAD, well developed, well nourished  HENT: Normocephalic, atraumatic  CV: Intact distal pulses, regular rate  Resp: No respiratory distress or labored breathing  Lymphatic: No lymphadenopathy palpated  Neuro: Alert and Oriented x 3, no focal deficits  Psych: Normal mood, normal affect, normal judgement, normal behavior  Skin: Warm, dry, no rashes, no erythema      Wrist/Hand focused exam:                RIGHT LEFT   ROM:   full Limited by pain however gentle flexion extension radial and ulnar deviation as well as pronation supination were performed with mild discomfort  Palpation: Effusion negative negative     Tenderness none distal radius   Instability:  stable stable   Special Tests: Phalen Negative Negative     TFCC grind Negative Negative     Piano key sign Negative Negative     Sandoval not tested not tested   Other:        UE NV Exam: +2 Radial pulses bilaterally  Sensation intact to light touch C5-T1 bilaterally, Radial/median/ulnar nerve motor intact      Bilateral shoulder, elbow, and forearm ROM full, painless with passive ROM, no ttp or crepitance throughout extremities above wrist joint    Cervical ROM is full without pain, numbness or tingling    Negative spurling maneuver bilaterally      Hand/Wrist Imaging:    X-rays of the left hand/wrist were reviewed, which demonstrate routine healing of distal radius fracture  The visit fracture line still visible however the some callus formation and sclerosis at the fracture site  No new fractures are appreciated      Scribe Attestation    I,:   am acting as a scribe while in the presence of the attending physician :       I,:   personally performed the services described in this documentation    as scribed in my presence :

## 2022-10-31 ENCOUNTER — HOSPITAL ENCOUNTER (EMERGENCY)
Facility: HOSPITAL | Age: 57
Discharge: HOME | End: 2022-10-31
Attending: EMERGENCY MEDICINE | Admitting: EMERGENCY MEDICINE

## 2022-10-31 ENCOUNTER — APPOINTMENT (EMERGENCY)
Dept: RADIOLOGY | Facility: HOSPITAL | Age: 57
End: 2022-10-31

## 2022-10-31 ENCOUNTER — APPOINTMENT (EMERGENCY)
Dept: CT IMAGING | Facility: HOSPITAL | Age: 57
End: 2022-10-31

## 2022-10-31 VITALS
DIASTOLIC BLOOD PRESSURE: 71 MMHG | TEMPERATURE: 98.5 F | HEART RATE: 70 BPM | SYSTOLIC BLOOD PRESSURE: 135 MMHG | BODY MASS INDEX: 36.43 KG/M2 | WEIGHT: 246.69 LBS | RESPIRATION RATE: 16 BRPM | OXYGEN SATURATION: 97 %
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
## 2022-10-31 RX ORDER — ACETAMINOPHEN 325 MG/1
650 TABLET ORAL ONCE
Status: COMPLETED | OUTPATIENT
Start: 2022-10-31 | End: 2022-10-31

## 2022-10-31 RX ADMIN — ACETAMINOPHEN 650 MG: 325 TABLET ORAL at 10:36

## 2022-10-31 NOTE — DISCHARGE INSTRUCTIONS
FINDINGS:      FACIAL BONES:   No facial bone fracture identified  Normal alignment of the temporomandibular joints  No lytic or blastic lesion  ORBITS:  Orbital globes, optic nerves, and extraocular muscles appear symmetric and normal  There is no evidence of retrobulbar mass, abscess, or hematoma  Periodontal disease noted     SINUSES:  Mucosal thickening within the inferior recess of the left maxillary sinus  No air-fluid levels  SOFT TISSUES:  Normal      IMPRESSION:     No evidence of facial bone fracture    PARENCHYMA:  No intracranial mass, mass effect or midline shift  No CT signs of acute infarction  No acute parenchymal hemorrhage  VENTRICLES AND EXTRA-AXIAL SPACES:  Normal for the patient's age  VISUALIZED ORBITS AND PARANASAL SINUSES:  Unremarkable  CALVARIUM AND EXTRACRANIAL SOFT TISSUES:  Normal      IMPRESSION:     No acute intracranial abnormality  FINDINGS:     ALIGNMENT:  Normal alignment of the cervical spine  No subluxation  VERTEBRAL BODIES:  No fracture  DEGENERATIVE CHANGES:  Mild multilevel cervical degenerative changes are noted without critical central canal stenosis  PREVERTEBRAL AND PARASPINAL SOFT TISSUES:  Unremarkable  THORACIC INLET:  Normal      IMPRESSION:     No cervical spine fracture or traumatic malalignment

## 2022-10-31 NOTE — Clinical Note
Kiran Negron was seen and treated in our emergency department on 10/31/2022  Diagnosis:     Edson Moura  may return to work on return date  She may return on this date: 11/02/2022         If you have any questions or concerns, please don't hesitate to call        Cora Quijano PA-C    ______________________________           _______________          _______________  Hospital Representative                              Date                                Time

## 2022-10-31 NOTE — ED PROVIDER NOTES
History  Chief Complaint   Patient presents with   • Head Injury     Pt reports tripping and falling this morning and hitting her face on concrete   Pt reports b/l knee pain and finger pain  Pt appears very sleepy but states she just got done working night shift  Pt denies blood thinners and Kezia Chapin is a 65 y o  female w/ no significant PMHx presenting to the ED for an evaluation after falling at work this morning  Pt reports she works as a  and tripped on a sidewalk outside while doing her rounds this morning  Pt reports that she "flew in the air" and landed on the R side of her body  She reports that she purposefully tried to avoid falling on her L arm because she is healing from a radial fracture which she sustained 1 month ago  PT reports she landed on her R knee, R hand, and R side of her face  She reports pain at her R knee, R ring and little finger, R shoulder, R lip, teeth at R upper jaw, L shin, and a generalized headache  She denies losing consciousness, weakness, dizziness, neck pain, back pain, abdominal pain or chest pain after the fall  She was able to help herself up and was able to walk after the fall  She denies taking any blood thinners or any medication for pain  She reports that she sustained the injury to her L radius one month ago when she fell also from tripping on a sidewalk  Prior to Admission Medications   Prescriptions Last Dose Informant Patient Reported?  Taking?   acetaminophen (TYLENOL) 325 mg tablet Not Taking at Unknown time  No No   Sig: Take 2 tablets (650 mg total) by mouth every 6 (six) hours as needed for moderate pain   Patient not taking: No sig reported   acetaminophen-codeine (TYLENOL #3) 300-30 mg per tablet Not Taking at Unknown time  No No   Sig: Take 1 tablet by mouth every 4 (four) hours as needed for moderate pain   Patient not taking: Reported on 10/31/2022   amLODIPine (NORVASC) 2 5 mg tablet 10/31/2022 at Unknown time  Yes Yes   Sig: Take 2 5 mg by mouth daily   ibuprofen (MOTRIN) 600 mg tablet Not Taking at Unknown time  No No   Sig: Take 1 tablet (600 mg total) by mouth every 6 (six) hours as needed for mild pain   Patient not taking: No sig reported   naproxen (EC NAPROSYN) 500 MG EC tablet Not Taking at Unknown time  No No   Sig: Take 1 tablet (500 mg total) by mouth 2 (two) times a day with meals   Patient not taking: No sig reported      Facility-Administered Medications: None       Past Medical History:   Diagnosis Date   • Hypertension        Past Surgical History:   Procedure Laterality Date   • HERNIA REPAIR     • HERNIA REPAIR         History reviewed  No pertinent family history  I have reviewed and agree with the history as documented  E-Cigarette/Vaping   • E-Cigarette Use Never User      E-Cigarette/Vaping Substances   • Nicotine No    • THC No    • CBD No    • Flavoring No    • Other No      Social History     Tobacco Use   • Smoking status: Current Every Day Smoker     Packs/day: 0 25     Types: Cigarettes   • Smokeless tobacco: Never Used   Vaping Use   • Vaping Use: Never used   Substance Use Topics   • Alcohol use: Yes     Comment: occasionally    • Drug use: Never       Review of Systems   Constitutional: Negative for activity change, chills and fever  HENT: Negative for congestion, ear pain and sore throat  Eyes: Negative for photophobia, pain and visual disturbance  Respiratory: Negative for cough and shortness of breath  Cardiovascular: Negative for chest pain, palpitations and leg swelling  Gastrointestinal: Negative for abdominal distention, abdominal pain, constipation, diarrhea, nausea and vomiting  Genitourinary: Negative for difficulty urinating, dysuria and hematuria  Musculoskeletal: Negative for arthralgias, back pain, gait problem and neck pain  R knee, R hand, and R facial pain   Skin: Negative for color change and rash  Neurological: Positive for headaches   Negative for seizures and syncope  All other systems reviewed and are negative  Physical Exam  Physical Exam  Vitals and nursing note reviewed  Constitutional:       General: She is not in acute distress  Appearance: She is well-developed  She is not ill-appearing or toxic-appearing  HENT:      Head: Normocephalic  Abrasion (R cheek ) present  No raccoon eyes, Monahan's sign, right periorbital erythema, left periorbital erythema or laceration  Jaw: There is normal jaw occlusion  No tenderness or pain on movement  Right Ear: Tympanic membrane normal       Left Ear: Tympanic membrane normal       Nose: Nose normal       Mouth/Throat:      Mouth: Mucous membranes are moist       Pharynx: No oropharyngeal exudate or posterior oropharyngeal erythema  Eyes:      Extraocular Movements: Extraocular movements intact  Conjunctiva/sclera: Conjunctivae normal       Pupils: Pupils are equal, round, and reactive to light  Cardiovascular:      Rate and Rhythm: Normal rate and regular rhythm  Heart sounds: No murmur heard  Pulmonary:      Effort: Pulmonary effort is normal  No respiratory distress  Breath sounds: Normal breath sounds  No wheezing or rales  Abdominal:      General: Bowel sounds are normal       Palpations: Abdomen is soft  Tenderness: There is no abdominal tenderness  Musculoskeletal:      Right shoulder: Bony tenderness (mild) present  Normal range of motion  Left shoulder: Normal       Right upper arm: Normal       Left upper arm: Normal       Right elbow: Normal       Left elbow: Normal       Right forearm: Normal       Left forearm: Tenderness (secondary to previous radial fracture) present  Right wrist: Normal       Left wrist: Normal       Right hand: Tenderness (over entire 4th and 5th digits) present  Decreased range of motion  Decreased strength (secondary to pain)  Normal sensation  Normal pulse  Left hand: Normal strength  Normal sensation   Normal pulse       Cervical back: Normal range of motion and neck supple  No tenderness  Thoracic back: No tenderness  Lumbar back: No tenderness  Right knee: Bony tenderness (medial ) present  No swelling  Decreased range of motion (secondary to pain)  Right lower leg: Normal  No edema  Left lower leg: Tenderness (anterior tibia) present  No lacerations  No edema  Skin:     General: Skin is warm and dry  Neurological:      Mental Status: She is alert  Vital Signs  ED Triage Vitals   Temperature Pulse Respirations Blood Pressure SpO2   10/31/22 0930 10/31/22 0930 10/31/22 0930 10/31/22 0930 10/31/22 0930   98 5 °F (36 9 °C) 81 16 133/89 98 %      Temp Source Heart Rate Source Patient Position - Orthostatic VS BP Location FiO2 (%)   10/31/22 0930 10/31/22 1154 10/31/22 0930 10/31/22 0930 --   Oral Monitor Sitting Right arm       Pain Score       10/31/22 1036       10 - Worst Possible Pain           Vitals:    10/31/22 0930 10/31/22 1154   BP: 133/89 135/71   Pulse: 81 70   Patient Position - Orthostatic VS: Sitting          Visual Acuity      ED Medications  Medications   acetaminophen (TYLENOL) tablet 650 mg (650 mg Oral Given 10/31/22 1036)       Diagnostic Studies  Results Reviewed     None                 XR tibia fibula 2 views LEFT   ED Interpretation by Kasie Lopez PA-C (10/31 1147)   No acute fractures or abnormalities when read by me  Final Result by Jaun Cage MD (10/31 1148)      No acute osseous abnormality  Workstation performed: [id number]         XR knee 4+ views Right injury   Final Result by Jaun Cage MD (10/31 1148)      No acute osseous abnormality  Workstation performed: [id number]         XR shoulder 2+ views RIGHT   ED Interpretation by Kasie Lopez PA-C (10/31 1146)   No acute fractures or abnormalities when read by me  Final Result by Jaun Cage MD (10/31 0992)      No acute osseous abnormality  Workstation performed: [id number]         XR forearm 2 views LEFT   ED Interpretation by Salinas Rice PA-C (10/31 1145)   No acute fracture when read by me  Final Result by Gregory Ac MD (10/31 1209)      No new fractures of the left forearm            Workstation performed: [id number]         XR hand 3+ vw right   ED Interpretation by Salinas Rice PA-C (10/31 1144)   No acute fracture or abnormality when read by me  Final Result by Davidson Sadler MD (10/31 1142)      No acute osseous abnormality  Workstation performed: [id number]         CT cervical spine without contrast   Final Result by Gregory Ac MD (10/31 1127)      No cervical spine fracture or traumatic malalignment  Workstation performed: [id number]         CT head without contrast   Final Result by Gregory Ac MD (10/31 1120)      No acute intracranial abnormality  Workstation performed: [id number]         CT facial bones without contrast   Final Result by Gregory Ac MD (10/31 1123)      No evidence of facial bone fracture               Workstation performed: [id number]                    Procedures  Procedures         ED Course  ED Course as of 10/31/22 1515   Mon Oct 31, 2022   1130 No cervical spine fracture or traumatic malalignment  1130 No evidence of facial bone fracture   1130 No acute intracranial abnormality  SBIRT 20yo+    Flowsheet Row Most Recent Value   SBIRT (23 yo +)    In order to provide better care to our patients, we are screening all of our patients for alcohol and drug use  Would it be okay to ask you these screening questions?  No Filed at: 10/31/2022 3653                    MDM  Number of Diagnoses or Management Options  Fall, initial encounter: new and requires workup  Diagnosis management comments: Anel Daigle is a 65 y o  female w/ no significant PMHx presenting to the ED for an evaluation after falling at work this morning  On exam pt is well appearing and in no acute distress  There is tenderness at L tibia, medial aspect of R knee, R shoulder, R 4th and 5th digits, R zygoma, and over teeth at R upper jaw  There is no signs of significant dental injury  Ordered X-rays at various locations of tenderness, CT head w/o contrast, CT facial bones, and CT C-spine  Ordered Tylenol  Pt understanding and agreeable with plan  Imaging at head and neck reveal no cervical spine fracture or traumatic malalignment, no evidence of facial bone fracture, and no clear intracranial abnormality  XR of R hand, R shoulder, L forearm, L tibia fibula, and R knee reveal no acute osseous abnormality when read by me  Upon re-examination patient is sleeping comfortably in bed  Mother reports that pt works night shift so she is feeling tired  Extensivelly discussed all imaging results with patient and mother  Pt reports her headache resolved with tylenol  Advised pt continues to follow with orthopedics for her L arm  Advised follow up with PCP  Advised strict return precautions to ED  Amount and/or Complexity of Data Reviewed  Tests in the radiology section of CPT®: reviewed and ordered  Independent visualization of images, tracings, or specimens: yes    Patient Progress  Patient progress: improved      Disposition  Final diagnoses:   Fall, initial encounter     Time reflects when diagnosis was documented in both MDM as applicable and the Disposition within this note     Time User Action Codes Description Comment    10/31/2022 11:55 AM Severiano Vega, initial encounter       ED Disposition     ED Disposition   Discharge    Condition   Stable    Date/Time   Mon Oct 31, 2022 11:55 AM    Paul Ly discharge to home/self care                 Follow-up Information     Follow up With Specialties Details Why Contact Info Additional [address] [facility] Emergency Department Emergency Medicine  If symptoms worsen [facility] [id number]  [address] [facility] Emergency Department, [address] [place] [address], [facility]  In 1 day  [address] [facility]  [phone]           Discharge Medication List as of 10/31/2022 12:04 PM      CONTINUE these medications which have NOT CHANGED    Details   amLODIPine (NORVASC) 2 5 mg tablet Take 2 5 mg by mouth daily, Starting Tue 1/4/2022, Until Wed 1/4/2023, Historical Med      acetaminophen (TYLENOL) 325 mg tablet Take 2 tablets (650 mg total) by mouth every 6 (six) hours as needed for moderate pain, Starting Wed 7/13/2022, Normal      acetaminophen-codeine (TYLENOL #3) 300-30 mg per tablet Take 1 tablet by mouth every 4 (four) hours as needed for moderate pain, Starting Fri 9/16/2022, Normal      ibuprofen (MOTRIN) 600 mg tablet Take 1 tablet (600 mg total) by mouth every 6 (six) hours as needed for mild pain, Starting Wed 7/13/2022, Normal      naproxen (EC NAPROSYN) 500 MG EC tablet Take 1 tablet (500 mg total) by mouth 2 (two) times a day with meals, Starting Thu 5/26/2022, Until Fri 5/26/2023, Normal             No discharge procedures on file      PDMP Review       Value Time User    PDMP Reviewed  Yes 9/16/2022 12:34 PM Marysue Kussmaul Pica, PA-C          ED Provider  Electronically Signed by           Radha Carrillo PA-C  10/31/22 8869

## 2022-11-02 ENCOUNTER — EVALUATION (OUTPATIENT)
Dept: PHYSICAL THERAPY | Facility: MEDICAL CENTER | Age: 57
End: 2022-11-02
Attending: STUDENT IN AN ORGANIZED HEALTH CARE EDUCATION/TRAINING PROGRAM

## 2022-11-02 DIAGNOSIS — S52.502A CLOSED FRACTURE OF DISTAL END OF LEFT RADIUS, UNSPECIFIED FRACTURE MORPHOLOGY, INITIAL ENCOUNTER: Primary | ICD-10-CM

## 2022-11-02 NOTE — PROGRESS NOTES
PT Evaluation     Today's date: 2022  Patient name: Angelica Blevins  : 1965  MRN: [id number]  Referring provider: Yesy Brown DO  Dx:   Encounter Diagnosis     ICD-10-CM    1  Closed fracture of distal end of left radius, unspecified fracture morphology, initial encounter  S57 329S Ambulatory Referral to PT/OT Hand Therapy                  Assessment  Assessment details: Pt is a 65year old RHD female who presents to PT following L DRF from a fall sustained on 22  Pt presents with prominent ulnar styloid and + ulnar variance from xray observation  No tenderness to distal radius, more along ulnar column  Pt has limited ROM in all directions, improved ranges with volar glide to distal ulna possibly correcting malalignment  She has full comp flexion of digits, moderate lag in thumb flexion, full PROM  Pt should benefit from skilled PT to help reduce pain, improve ROM, improve strength when appropriate, and better her overall functioning to perform her work duties without restrictions   Thank you kindly for your referral   Impairments: abnormal or restricted ROM, activity intolerance, impaired physical strength, lacks appropriate home exercise program and pain with function  Understanding of Dx/Px/POC: good   Prognosis: good    Goals  STG (2-3 weeks)  1: Pt independent in HEP  2: improve AROM 10-15 degrees  3: full AROM thumb    LTG (4-6 weeks)  1: Improve FOTO 10-15 pts  2: wrist AROM WFL  3: wrist PROM WNL  4: full FA rotation  5: decrease pain 2 grades on VAS  6: wrist stabilizers 4+ to 5/5  7:  strength within 20 lbs of uninvolved side      Plan  Plan details: Initiate PT as per POC  Patient would benefit from: skilled physical therapy  Planned modality interventions: thermotherapy: hydrocollator packs and cryotherapy  Planned therapy interventions: joint mobilization, manual therapy, massage, neuromuscular re-education, patient education, stretching, therapeutic activities, therapeutic exercise, fine motor coordination training, flexibility, functional ROM exercises and home exercise program  Frequency: 2x week  Duration in visits: 12  Duration in weeks: 6  Plan of Care beginning date: 2022  Plan of Care expiration date: 2022  Treatment plan discussed with: patient        Subjective Evaluation    History of Present Illness  Date of onset: 2022  Mechanism of injury: trauma  Mechanism of injury: Tripped at work during commotion, sidewalk was abnormal  Went occumed- thought she only sprained it  Casted for 4 weeks  Having some stiffness moving in each direction    No previous injuries to L wrist            Not a recurrent problem   Quality of life: good    Pain  Current pain ratin  At best pain ratin  At worst pain rating: 10  Relieving factors: rest and support  Progression: improved    Social Support    Employment status: working ([employer] and [employer])  Hand dominance: right    Patient Goals  Patient goals for therapy: decreased pain, increased motion, increased strength and independence with ADLs/IADLs          Objective     Active Range of Motion     Left Wrist   Wrist flexion: 12 degrees   Wrist extension: 22 degrees   Radial deviation: 10 degrees   Ulnar deviation: 10 degrees with pain      Right Wrist   Wrist flexion: 50 degrees   Wrist extension: 75 degrees   Radial deviation: 15 degrees   Ulnar deviation: 35 degrees     Additional Active Range of Motion Details  Sup/pron L 65/60; R 80/70  Full comp flexion, mild stiffness at her MCP joints  Poor active flexion of thumb, able to isolate IP flexion full with blocking, no passive restrictions    Passive Range of Motion     Left Wrist   Wrist flexion: 25 degrees   Wrist extension: 35 degrees     Additional Passive Range of Motion Details  End feel soft- more soft tissue restriction  Wrist and FA ROM improved with volar glide to distal ulna, possible malalignment at DRUJ; no significant hypermobility  + ulnar positive variance upon xray- reviewed with Pt             Precautions: DOI 9/16/22  HEP: wrist, forearm, thumb, AA/AROM      Manuals             Wrist AAROM                                                    Neuro Re-Ed             Wrist widget             Grooved pegs                                                                              Ther Ex             Towel bunches             Ball roll for wrist             Pegs grasping             Wrist AROM             FA rotation AROM                                                    Ther Activity                                       Gait Training                                       Modalities             MH             CP

## 2022-11-11 ENCOUNTER — OFFICE VISIT (OUTPATIENT)
Dept: PHYSICAL THERAPY | Facility: MEDICAL CENTER | Age: 57
End: 2022-11-11
Attending: STUDENT IN AN ORGANIZED HEALTH CARE EDUCATION/TRAINING PROGRAM

## 2022-11-11 DIAGNOSIS — S52.502A CLOSED FRACTURE OF DISTAL END OF LEFT RADIUS, UNSPECIFIED FRACTURE MORPHOLOGY, INITIAL ENCOUNTER: Primary | ICD-10-CM

## 2022-11-11 NOTE — PROGRESS NOTES
Daily Note     Today's date: 2022  Patient name: Zohreh Thomas  : 1965  MRN: [id number]  Referring provider: Janna Barker DO  Dx:   Encounter Diagnosis     ICD-10-CM    1  Closed fracture of distal end of left radius, unspecified fracture morphology, initial encounter  S52 502A                   Subjective: Patient reports compliance with home exercises  Objective: See treatment diary below      Assessment: Patient presents with significant left wrist stiffness  Flexion 20 degrees, extension 30 degrees  FA rotation 60/60 degrees  Guarding with manual stretching, had improved tolerance to AROM  No significant soreness reported post treatment session  Patient would benefit from continued PT to address mobility impairments to restore functional use of her left wrist        Plan: Continue per plan of care  Progress treatment as tolerated         Precautions: DOI 22  HEP: wrist, forearm, thumb, AA/AROM      Manuals             Wrist AAROM x10'                                                   Neuro Re-Ed             Wrist widget             Grooved pegs                                                                              Ther Ex             Towel bunches x3'            Ball roll for wrist x3'            Pegs grasping x2'            Wrist AROM Cone x20 ea            FA rotation AROM Wheel x3'                                                   Ther Activity                                       Gait Training                                       Modalities             MH 10'            CP deferred

## 2022-11-15 ENCOUNTER — OFFICE VISIT (OUTPATIENT)
Dept: PHYSICAL THERAPY | Facility: MEDICAL CENTER | Age: 57
End: 2022-11-15
Attending: STUDENT IN AN ORGANIZED HEALTH CARE EDUCATION/TRAINING PROGRAM

## 2022-11-15 DIAGNOSIS — S52.502A CLOSED FRACTURE OF DISTAL END OF LEFT RADIUS, UNSPECIFIED FRACTURE MORPHOLOGY, INITIAL ENCOUNTER: Primary | ICD-10-CM

## 2022-11-15 NOTE — PROGRESS NOTES
Daily Note     Today's date: 11/15/2022  Patient name: Rishi Willingham  : 1965  MRN: [id number]  Referring provider: Jaime Rodríguez DO  Dx:   Encounter Diagnosis     ICD-10-CM    1  Closed fracture of distal end of left radius, unspecified fracture morphology, initial encounter  S52 502A                   Subjective: Pt reports her wrist is doing better overall  Trying to wean out of brace when she can  Objective: See treatment diary below      Assessment: Tolerated treatment well  Patient exhibited good technique with therapeutic exercises and would benefit from continued PT   Wrist AAROM  Flex/ext 50/40, improved ROM  Initiated light strengthening, pt tolerated well  Pt noted she tolerated MH better than CP    Plan: Continue per plan of care        Precautions: DOI 22  HEP: wrist, forearm, thumb, AA/AROM      Manuals 11/11 11/15           Wrist AAROM x10' x10'                                                  Neuro Re-Ed             Wrist widget  NV           Grooved pegs  NV                                                                            Ther Ex             Towel bunches x3'            Ball roll for wrist x3' x3'           Pegs grasping x2' fingerweb red 30x           Wrist AROM Cone x20 ea 2# 2x10           FA rotation AROM Wheel x3' 2# 2x10           flexbar towel twist  yellow 20x           flexbar sup/pron  yellow 20x             20           Ther Activity                                       Gait Training                                       Modalities             MH 10' 10' x2           CP deferred

## 2022-11-18 ENCOUNTER — APPOINTMENT (OUTPATIENT)
Dept: PHYSICAL THERAPY | Facility: MEDICAL CENTER | Age: 57
End: 2022-11-18
Attending: STUDENT IN AN ORGANIZED HEALTH CARE EDUCATION/TRAINING PROGRAM

## 2022-11-22 ENCOUNTER — OFFICE VISIT (OUTPATIENT)
Dept: PHYSICAL THERAPY | Facility: MEDICAL CENTER | Age: 57
End: 2022-11-22
Attending: STUDENT IN AN ORGANIZED HEALTH CARE EDUCATION/TRAINING PROGRAM

## 2022-11-22 DIAGNOSIS — S52.502A CLOSED FRACTURE OF DISTAL END OF LEFT RADIUS, UNSPECIFIED FRACTURE MORPHOLOGY, INITIAL ENCOUNTER: Primary | ICD-10-CM

## 2022-11-22 NOTE — PROGRESS NOTES
Daily Note     Today's date: 2022  Patient name: Sandra Huddleston  : 1965  MRN: [id number]  Referring provider: Harpreet Aguilar DO  Dx:   Encounter Diagnosis     ICD-10-CM    1  Closed fracture of distal end of left radius, unspecified fracture morphology, initial encounter  S59 012C                      Subjective: Pt reports she had soreness for a couple days after last session  Able to drive without brace, able pull up pants easier  Objective: See treatment diary below      Assessment: Tolerated treatment well  Patient exhibited good technique with therapeutic exercises and would benefit from continued PT  Pt has increased stiffness moving into wrist flexion and extension; FA rotation WNL  Advised Pt to continue with her stretching pt admitted to not doing her exercises altogether due to soreness after last session  Lessened program some for better tolerance to light strengthening  Reviewed HEP with Pt, pt in agreement in doing her exercises in between sessions    Plan: Continue per plan of care        Precautions: DOI 22  HEP: wrist, forearm, thumb, AA/AROM      Manuals 11/11 11/15 11/22          Wrist AAROM x10' x10' x10'                                                 Neuro Re-Ed             Wrist widget  NV           Grooved pegs  NV                                                                            Ther Ex             Towel bunches x3'            Ball roll for wrist x3' x3' x3'          Pegs grasping x2' fingerweb red 30x fingerweb red 30x          Wrist AROM Cone x20 ea 2# 2x10 1# 20x          FA rotation AROM Wheel x3' 2# 2x10 1# 20x          flexbar towel twist  yellow 20x  yellow 20x          flexbar sup/pron  yellow 20x  yelow 20x          clothespins   Blue/green 4 min            20 25          Ther Activity                                       Gait Training                                       Modalities              10' 10' x2 10          CP deferred

## 2022-11-29 ENCOUNTER — OFFICE VISIT (OUTPATIENT)
Dept: PHYSICAL THERAPY | Facility: MEDICAL CENTER | Age: 57
End: 2022-11-29
Attending: STUDENT IN AN ORGANIZED HEALTH CARE EDUCATION/TRAINING PROGRAM

## 2022-11-29 DIAGNOSIS — S52.502A CLOSED FRACTURE OF DISTAL END OF LEFT RADIUS, UNSPECIFIED FRACTURE MORPHOLOGY, INITIAL ENCOUNTER: Primary | ICD-10-CM

## 2022-12-02 ENCOUNTER — OFFICE VISIT (OUTPATIENT)
Dept: PHYSICAL THERAPY | Facility: MEDICAL CENTER | Age: 57
End: 2022-12-02
Attending: STUDENT IN AN ORGANIZED HEALTH CARE EDUCATION/TRAINING PROGRAM

## 2022-12-02 DIAGNOSIS — S52.502A CLOSED FRACTURE OF DISTAL END OF LEFT RADIUS, UNSPECIFIED FRACTURE MORPHOLOGY, INITIAL ENCOUNTER: Primary | ICD-10-CM

## 2022-12-02 NOTE — PROGRESS NOTES
Daily Note     Today's date: 2022  Patient name: Jarrett Jean  : 1965  MRN: [id number]  Referring provider: Maria Luisa Acosta DO  Dx:   Encounter Diagnosis     ICD-10-CM    1  Closed fracture of distal end of left radius, unspecified fracture morphology, initial encounter  S52 502A                      Subjective: Patient states she is having difficulty with pulling and twisting motions  She states she is stiff when using her brace  She did drive without her brace and is not wearing her brace to sleep  Objective: See treatment diary below    Assessment: Patient should continue weaning from wrist splint  ROM is progressing well  She has functional ROM in extension and forearm rotation  She remains moderately limited in wrist flexion  Reviewed wrist flexor stretches with patient and provided her with her updated pictures and instructions for home  Added in wrist maze which was challenging for her to isolate wrist motion  She required cueing to minimize proximal movement to compensate for loss of wrist motion  Able to progress resistance for strengthening  Cueing provided to move through available wrist ROM  Added in resisted row to address pulling difficulties  Patient would benefit from continued PT to address mobility and strength impairments to improve tolerance to activity  Plan: Continue per plan of care        Precautions: DOI 22  HEP: wrist, forearm, thumb, AA/AROM      Manuals 11/11 11/15 11/22 11/29 12/2        Wrist AAROM x10' x10' x10' x10' x10'                                               Neuro Re-Ed             Wrist widget  NV   x3'        Grooved pegs  NV                                                                            Ther Ex             Towel bunches x3'            Ball roll for wrist x3' x3' x3' x3' x3'        Pegs grasping x2' fingerweb red 30x fingerweb red 30x fingerweb red 30x Red x30        Wrist AROM Cone x20 ea 2# 2x10 1# 20x 1# 20x 2# 2x10        FA rotation AROM Wheel x3' 2# 2x10 1# 20x 1# 20x 2# 2x10        flexbar towel twist  yellow 20x  yellow 20x yellow 20x Red x20        flexbar sup/pron  yellow 20x  yelow 20x yelow 20x Red x20        clothespins   Blue/green 4 min Blue/green 4 min Blue/ green 4 min        TB row single arm     Red x30          20 25 25 min 30        Ther Activity                                       Gait Training                                       Modalities             MH 10' 10' x2 10 10 10        CP deferred

## 2022-12-06 ENCOUNTER — OFFICE VISIT (OUTPATIENT)
Dept: PHYSICAL THERAPY | Facility: MEDICAL CENTER | Age: 57
End: 2022-12-06
Attending: STUDENT IN AN ORGANIZED HEALTH CARE EDUCATION/TRAINING PROGRAM

## 2022-12-06 DIAGNOSIS — S52.502A CLOSED FRACTURE OF DISTAL END OF LEFT RADIUS, UNSPECIFIED FRACTURE MORPHOLOGY, INITIAL ENCOUNTER: Primary | ICD-10-CM

## 2022-12-06 NOTE — PROGRESS NOTES
Daily Note     Today's date: 2022  Patient name: Milan Maher  : 1965  MRN: [id number]  Referring provider: Carol Garces DO  Dx:   Encounter Diagnosis     ICD-10-CM    1  Closed fracture of distal end of left radius, unspecified fracture morphology, initial encounter  S52 502A                      Subjective:P reported she had some pain after last session but felt okay later on in the day  Has not been wearing her brace, doing okay without it  Objective: See treatment diary below      Assessment: Tolerated treatment well  Patient exhibited good technique with therapeutic exercises and would benefit from continued PT  Pt still has moderate stiffness moving into wrist flexion, reviewed stretching techniques with pt  No complaints of pain post session, just mild fatigue    Plan: Continue per plan of care        Precautions: DOI 22  HEP: wrist, forearm, thumb, AA/AROM      Manuals 11/11 11/15 11/22 11/29 12/2 12/6       Wrist AAROM x10' x10' x10' x10' x10' x10'                                              Neuro Re-Ed             Wrist widget  NV   x3' x3'       Grooved pegs  NV                                                                            Ther Ex             Towel bunches x3'            Ball roll for wrist x3' x3' x3' x3' x3' x3'       Pegs grasping x2' fingerweb red 30x fingerweb red 30x fingerweb red 30x Red x30 green 30x       Wrist AROM Cone x20 ea 2# 2x10 1# 20x 1# 20x 2# 2x10 2# 2x10       FA rotation AROM Wheel x3' 2# 2x10 1# 20x 1# 20x 2# 2x10 2# 2x10       flexbar towel twist  yellow 20x  yellow 20x yellow 20x Red x20 Red x20       flexbar sup/pron  yellow 20x  yelow 20x yelow 20x Red x20 Red x20       clothespins   Blue/green 4 min Blue/green 4 min Blue/ green 4 min blue/green 4 min       TB row single arm     Red x30 Red x30         20 25 25 min 30 30       Ther Activity                                       Gait Training Modalities             MH 10' 10' x2 10 10 10 10       CP deferred

## 2022-12-13 ENCOUNTER — OFFICE VISIT (OUTPATIENT)
Dept: PHYSICAL THERAPY | Facility: MEDICAL CENTER | Age: 57
End: 2022-12-13
Attending: STUDENT IN AN ORGANIZED HEALTH CARE EDUCATION/TRAINING PROGRAM

## 2022-12-13 DIAGNOSIS — S52.502A CLOSED FRACTURE OF DISTAL END OF LEFT RADIUS, UNSPECIFIED FRACTURE MORPHOLOGY, INITIAL ENCOUNTER: Primary | ICD-10-CM

## 2022-12-13 NOTE — PROGRESS NOTES
Daily Note/Re-evaluation     Today's date: 2022  Patient name: Louie Matson  : 1965  MRN: [id number]  Referring provider: Saray Rodriguez DO  Dx:   Encounter Diagnosis     ICD-10-CM    1  Closed fracture of distal end of left radius, unspecified fracture morphology, initial encounter  S52 502S                      Subjective: Pt reports her wrist is still verys tiff moving the wrist down ( flexion) working on it but it stays very stiff  Objective: See treatment diary below      Assessment: Tolerated treatment well  Patient exhibited good technique with therapeutic exercises and would benefit from continued PT   AROM  Flex/ext 30/60  Sup/pron75/70  PROM  Flex/ext 45/65  Sup/pron 85/80   FCR 4+  FCU 4+  ECU 4+  ECRL/B 4+   II R/L 60/20 lbs  Lp 22/14lbs  3pt 20/5 lbs  Added putty exercises, wrist TB PRE's, and MWM for home  Pt understood instructions for each  Goals  STG (2-3 weeks)  1: Pt independent in HEP- met  2: improve AROM 10-15 degrees- met  3: full AROM thumb- met- met    LTG (4-6 weeks)  1: Improve FOTO 10-15 pts- met  2: wrist AROM WFL-met  3: wrist PROM WNL- not met  4: full FA rotation- met  5: decrease pain 2 grades on VAS- met  6: wrist stabilizers 4+ to 5/5- met  7:  strength within 20 lbs of uninvolved side- not met  Pt still has some stiffness moving into wrist flexion  Also Pt has moderate weakness in her  and 2pt pinch strength  Recommend continuing for another 4 weeks to better her ROM and strength  Plan: Continue per plan of care        Precautions: DOI 22  HEP: wrist, forearm, thumb, AA/AROM, PROM, MWM for wrist ext/flex, putty roll/pinch, putty digit abd/add, putty pinch and pull, putty press, wrist Tb PRE's      Manuals 11/11 11/15 11/22 11/29 12/2 12/6 12/13      Wrist AAROM x10' x10' x10' x10' x10' x10' x10'                          Ex instruction/assessment 20             30      Neuro Re-Ed             Wrist widget  NV   x3' x3' x3' Grooved pegs  NV                                                                            Ther Ex             Towel bunches x3'            Ball roll for wrist x3' x3' x3' x3' x3' x3' NP      Pegs grasping x2' fingerweb red 30x fingerweb red 30x fingerweb red 30x Red x30 green 30x green 30x      Wrist AROM Cone x20 ea 2# 2x10 1# 20x 1# 20x 2# 2x10 2# 2x10 2# 310      FA rotation AROM Wheel x3' 2# 2x10 1# 20x 1# 20x 2# 2x10 2# 2x10 2# 3x10      flexbar towel twist  yellow 20x  yellow 20x yellow 20x Red x20 Red x20  red 20x      flexbar sup/pron  yellow 20x  yelow 20x yelow 20x Red x20 Red x20 Red 20x      clothespins   Blue/green 4 min Blue/green 4 min Blue/ green 4 min blue/green 4 min blue/green 4 min      TB row single arm     Red x30 Red x30         20 25 25 min 30 30 20      Ther Activity                                       Gait Training                                       Modalities             MH 10' 10' x2 10 10 10 10 10      CP deferred

## 2022-12-16 ENCOUNTER — APPOINTMENT (OUTPATIENT)
Dept: PHYSICAL THERAPY | Facility: MEDICAL CENTER | Age: 57
End: 2022-12-16
Attending: STUDENT IN AN ORGANIZED HEALTH CARE EDUCATION/TRAINING PROGRAM

## 2022-12-16 ENCOUNTER — APPOINTMENT (OUTPATIENT)
Dept: RADIOLOGY | Facility: AMBULARY SURGERY CENTER | Age: 57
End: 2022-12-16
Attending: STUDENT IN AN ORGANIZED HEALTH CARE EDUCATION/TRAINING PROGRAM

## 2022-12-16 ENCOUNTER — TELEPHONE (OUTPATIENT)
Dept: OBGYN CLINIC | Facility: OTHER | Age: 57
End: 2022-12-16

## 2022-12-16 ENCOUNTER — OFFICE VISIT (OUTPATIENT)
Dept: OBGYN CLINIC | Facility: CLINIC | Age: 57
End: 2022-12-16

## 2022-12-16 VITALS
DIASTOLIC BLOOD PRESSURE: 108 MMHG | HEART RATE: 84 BPM | BODY MASS INDEX: 36.43 KG/M2 | HEIGHT: 69 IN | WEIGHT: 246 LBS | SYSTOLIC BLOOD PRESSURE: 147 MMHG

## 2022-12-16 DIAGNOSIS — S52.502A CLOSED FRACTURE OF DISTAL END OF LEFT RADIUS, UNSPECIFIED FRACTURE MORPHOLOGY, INITIAL ENCOUNTER: ICD-10-CM

## 2022-12-16 DIAGNOSIS — S52.502A CLOSED FRACTURE OF DISTAL END OF LEFT RADIUS, UNSPECIFIED FRACTURE MORPHOLOGY, INITIAL ENCOUNTER: Primary | ICD-10-CM

## 2022-12-16 NOTE — TELEPHONE ENCOUNTER
Patient is being referred to a orthopedics  Please schedule accordingly      [address]   [phone]

## 2022-12-20 ENCOUNTER — APPOINTMENT (OUTPATIENT)
Dept: PHYSICAL THERAPY | Facility: MEDICAL CENTER | Age: 57
End: 2022-12-20
Attending: STUDENT IN AN ORGANIZED HEALTH CARE EDUCATION/TRAINING PROGRAM

## 2023-04-29 ENCOUNTER — HOSPITAL ENCOUNTER (EMERGENCY)
Facility: HOSPITAL | Age: 58
Discharge: HOME | End: 2023-04-29
Attending: EMERGENCY MEDICINE | Admitting: EMERGENCY MEDICINE

## 2023-04-29 VITALS
OXYGEN SATURATION: 98 % | BODY MASS INDEX: 37.68 KG/M2 | SYSTOLIC BLOOD PRESSURE: 151 MMHG | WEIGHT: 254.41 LBS | RESPIRATION RATE: 18 BRPM | HEART RATE: 67 BPM | HEIGHT: 69 IN | DIASTOLIC BLOOD PRESSURE: 116 MMHG | TEMPERATURE: 97.9 F

## 2023-04-29 DIAGNOSIS — N93.9 ABNORMAL VAGINAL BLEEDING: ICD-10-CM

## 2023-04-29 DIAGNOSIS — N93.9 VAGINAL BLEEDING: Primary | ICD-10-CM

## 2023-04-29 LAB
BASOPHILS # BLD AUTO: 0.03 THOUSANDS/ΜL (ref 0–0.1)
BASOPHILS NFR BLD AUTO: 1 % (ref 0–1)
EOSINOPHIL # BLD AUTO: 0.09 THOUSAND/ΜL (ref 0–0.61)
EOSINOPHIL NFR BLD AUTO: 2 % (ref 0–6)
ERYTHROCYTE [DISTWIDTH] IN BLOOD BY AUTOMATED COUNT: 15.3 % (ref 11.6–15.1)
HCG SERPL QL: NEGATIVE
HCT VFR BLD AUTO: 43.6 % (ref 34.8–46.1)
HGB BLD-MCNC: 13.4 G/DL (ref 11.5–15.4)
IMM GRANULOCYTES # BLD AUTO: 0.01 THOUSAND/UL (ref 0–0.2)
IMM GRANULOCYTES NFR BLD AUTO: 0 % (ref 0–2)
LYMPHOCYTES # BLD AUTO: 1.37 THOUSANDS/ΜL (ref 0.6–4.47)
LYMPHOCYTES NFR BLD AUTO: 27 % (ref 14–44)
MCH RBC QN AUTO: 26.7 PG (ref 26.8–34.3)
MCHC RBC AUTO-ENTMCNC: 30.7 G/DL (ref 31.4–37.4)
MCV RBC AUTO: 87 FL (ref 82–98)
MONOCYTES # BLD AUTO: 0.44 THOUSAND/ΜL (ref 0.17–1.22)
MONOCYTES NFR BLD AUTO: 9 % (ref 4–12)
NEUTROPHILS # BLD AUTO: 3.23 THOUSANDS/ΜL (ref 1.85–7.62)
NEUTS SEG NFR BLD AUTO: 61 % (ref 43–75)
NRBC BLD AUTO-RTO: 0 /100 WBCS
PLATELET # BLD AUTO: 246 THOUSANDS/UL (ref 149–390)
PMV BLD AUTO: 9 FL (ref 8.9–12.7)
RBC # BLD AUTO: 5.02 MILLION/UL (ref 3.81–5.12)
WBC # BLD AUTO: 5.17 THOUSAND/UL (ref 4.31–10.16)

## 2023-04-30 NOTE — ED PROVIDER NOTES
History  Chief Complaint   Patient presents with    Vaginal Bleeding     Pt reports vaginal bleeding x 2 weeks  Pt reports over the last 3-4 days it has gotten heavier  Pt denies any urinary sxs  Pt admits to some feelings of being fatigue and weak  HPI    32-year-old female presenting for evaluation of vaginal bleeding  Began 2 weeks ago and was mild  Progressively worsened prompted her to come to the emergency department for evaluation  States that she went through 7 pads in the last 8 hours today  States that she feels a little bit weak and has been told that she is anemic in the past  States that the bleeding is similar to her periods in the past but she has not had a period in over a year so she thought that she was post-menopausal  Denies any associated pain  Denies any trauma  Prior to Admission Medications   Prescriptions Last Dose Informant Patient Reported? Taking?   acetaminophen (TYLENOL) 325 mg tablet   No No   Sig: Take 2 tablets (650 mg total) by mouth every 6 (six) hours as needed for moderate pain   Patient not taking: Reported on 9/21/2022   acetaminophen-codeine (TYLENOL #3) 300-30 mg per tablet   No No   Sig: Take 1 tablet by mouth every 4 (four) hours as needed for moderate pain   Patient not taking: Reported on 10/31/2022   amLODIPine (NORVASC) 2 5 mg tablet 4/28/2023  Yes Yes   Sig: Take 2 5 mg by mouth daily   ibuprofen (MOTRIN) 600 mg tablet   No No   Sig: Take 1 tablet (600 mg total) by mouth every 6 (six) hours as needed for mild pain   Patient not taking: Reported on 9/21/2022   naproxen (EC NAPROSYN) 500 MG EC tablet   No No   Sig: Take 1 tablet (500 mg total) by mouth 2 (two) times a day with meals   Patient not taking: Reported on 9/21/2022      Facility-Administered Medications: None       Past Medical History:   Diagnosis Date    Hypertension        Past Surgical History:   Procedure Laterality Date    HERNIA REPAIR      HERNIA REPAIR         History reviewed   No pertinent family history  I have reviewed and agree with the history as documented  E-Cigarette/Vaping    E-Cigarette Use Never User      E-Cigarette/Vaping Substances    Nicotine No     THC No     CBD No     Flavoring No     Other No      Social History     Tobacco Use    Smoking status: Every Day     Packs/day: 0 25     Types: Cigarettes    Smokeless tobacco: Never   Vaping Use    Vaping Use: Never used   Substance Use Topics    Alcohol use: Yes     Comment: occasionally     Drug use: Never        Review of Systems   Constitutional: Positive for fatigue  Negative for chills and fever  HENT: Negative for sore throat  Respiratory: Negative for cough and shortness of breath  Cardiovascular: Negative for chest pain, palpitations and leg swelling  Gastrointestinal: Negative for abdominal pain, anal bleeding, blood in stool, diarrhea, nausea and vomiting  Genitourinary: Positive for vaginal bleeding  Negative for dysuria, frequency and hematuria  Musculoskeletal: Negative for myalgias  Skin: Negative for rash and wound  Neurological: Negative for dizziness, light-headedness and headaches  All other systems reviewed and are negative  Physical Exam  ED Triage Vitals [04/29/23 2232]   Temperature Pulse Respirations Blood Pressure SpO2   97 9 °F (36 6 °C) 67 18 (!) 151/116 98 %      Temp Source Heart Rate Source Patient Position - Orthostatic VS BP Location FiO2 (%)   Oral -- -- -- --      Pain Score       --             Orthostatic Vital Signs  Vitals:    04/29/23 2232   BP: (!) 151/116   Pulse: 67       Physical Exam  Vitals and nursing note reviewed  Exam conducted with a chaperone present  Constitutional:       General: She is not in acute distress  Appearance: Normal appearance  She is not ill-appearing or toxic-appearing  HENT:      Head: Normocephalic and atraumatic        Right Ear: External ear normal       Left Ear: External ear normal       Nose: Nose normal  Mouth/Throat:      Mouth: Mucous membranes are moist       Pharynx: Oropharynx is clear  Eyes:      General: No scleral icterus  Extraocular Movements: Extraocular movements intact  Cardiovascular:      Rate and Rhythm: Normal rate and regular rhythm  Pulses: Normal pulses  Pulmonary:      Effort: Pulmonary effort is normal  No respiratory distress  Breath sounds: Normal breath sounds  Abdominal:      Palpations: Abdomen is soft  Tenderness: There is no abdominal tenderness  There is no guarding or rebound  Genitourinary:     Comments: Vaginal bleeding  Musculoskeletal:         General: Normal range of motion  Cervical back: Normal range of motion and neck supple  Skin:     General: Skin is warm  Capillary Refill: Capillary refill takes less than 2 seconds  Neurological:      General: No focal deficit present  Mental Status: She is alert and oriented to person, place, and time           ED Medications  Medications - No data to display    Diagnostic Studies  Results Reviewed     Procedure Component Value Units Date/Time    hCG, qualitative pregnancy [[id number]]  (Normal) Collected: 04/29/23 2302    Lab Status: Final result Specimen: Blood from Arm, Right Updated: 04/29/23 2338     Preg, Serum Negative    CBC and differential [[id number]]  (Abnormal) Collected: 04/29/23 2302    Lab Status: Final result Specimen: Blood from Arm, Right Updated: 04/29/23 2315     WBC 5 17 Thousand/uL      RBC 5 02 Million/uL      Hemoglobin 13 4 g/dL      Hematocrit 43 6 %      MCV 87 fL      MCH 26 7 pg      MCHC 30 7 g/dL      RDW 15 3 %      MPV 9 0 fL      Platelets 461 Thousands/uL      nRBC 0 /100 WBCs      Neutrophils Relative 61 %      Immat GRANS % 0 %      Lymphocytes Relative 27 %      Monocytes Relative 9 %      Eosinophils Relative 2 %      Basophils Relative 1 %      Neutrophils Absolute 3 23 Thousands/µL      Immature Grans Absolute 0 01 Thousand/uL      Lymphocytes Absolute 1 37 Thousands/µL      Monocytes Absolute 0 44 Thousand/µL      Eosinophils Absolute 0 09 Thousand/µL      Basophils Absolute 0 03 Thousands/µL                  No orders to display         Procedures  Procedures      ED Course  ED Course as of 04/29/23 2344   Sat Apr 29, 2023   2316 Hemoglobin: 13 4  Normal   2338 PREGNANCY, SERUM: Negative                             SBIRT 20yo+    Flowsheet Row Most Recent Value   Initial Alcohol Screen: US AUDIT-C     1  How often do you have a drink containing alcohol? 1 Filed at: 04/29/2023 2237   2  How many drinks containing alcohol do you have on a typical day you are drinking? 1 Filed at: 04/29/2023 2237   3b  FEMALE Any Age, or MALE 65+: How often do you have 4 or more drinks on one occassion? 1 Filed at: 04/29/2023 2237   Audit-C Score 3 Filed at: 04/29/2023 2237   ESE: How many times in the past year have you    Used an illegal drug or used a prescription medication for non-medical reasons? Never Filed at: 04/29/2023 2237                Medical Decision Making  66 yo F presenting for vaginal bleeding with associated fatigue  On exam, she is well appearing, vitals WNL  Differential includes but is not limited to dysmenorrhea, post-menopausal vaginal bleeding, acute blood loss anemia, endometrial CA  Less likely, ectopic pregnancy  Plan: CBC for acute blood loss anemia, pregnancy  Doubt vaginal laceration as there is no pain  I reviewed the patient's chart including Care Everywhere including but not limited to prior evaluations, prior imaging, prior labs to the extent of the time constraints of the Emergency Department  I personally reviewed and interpreted all of the lab work, imaging, EKGs ordered  See ED course for further discussion  Significant for: CBC does not show acute blood loss anemia  Pregnancy negative  Discussed results with patient, recommended outpatient gyn follow up and gave return to ED precautions   All questions were answered at this time      I reviewed all testing with the patient: see above  I gave oral return precautions for what to return for in addition to the written return precautions  The patient (and any family present: n/a) verbalized understanding of the discharge instructions and warnings that would necessitate return to the Emergency Department  I specifically highlighted areas of special concern regarding the written and verbal discharge instructions and return precautions  All questions were answered prior to discharge  Amount and/or Complexity of Data Reviewed  Labs: ordered  Decision-making details documented in ED Course  Disposition  Final diagnoses:   Vaginal bleeding   Abnormal vaginal bleeding     Time reflects when diagnosis was documented in both MDM as applicable and the Disposition within this note     Time User Action Codes Description Comment    4/29/2023 11:22 PM Paoe Alexandria Add [N93 9] Vaginal bleeding     4/29/2023 11:22 PM Paoe Woodlyn Add [N93 9] Abnormal vaginal bleeding       ED Disposition     ED Disposition   Discharge    Condition   Stable    Date/Time   Sat Apr 29, 2023 11:39 PM    Wasseryamilet 9 discharge to home/self care                 Follow-up Information     Follow up With Specialties Details Why Contact Info Additional [address] [facility] Obstetrics and Gynecology  For Emergency Department Follow-up [address]  [address] [facility] [address]  [address], [address], [address], [place], [address] [address] [place] [address] Emergency Department Emergency Medicine  If symptoms worsen [facility] [id number]  [address] [place] [address] Emergency Department, [address] [place] [address] , [place], [place], [address]          Patient's Medications   Discharge Prescriptions    No medications on file No discharge procedures on file  PDMP Review       Value Time User    PDMP Reviewed  Yes 9/16/2022 12:34 PM Rajat Gillis PA-C           ED Provider  Attending physically available and evaluated Zahida Ham  KIM managed the patient along with the ED Attending      Electronically Signed by         Giancarlo Javed DO  04/29/23 9814

## 2023-04-30 NOTE — DISCHARGE INSTRUCTIONS
Your lab work was normal today and did not show anemia  Follow up with gynecology  Return to the ED if you develop any of the concerning symptoms we discussed

## 2023-04-30 NOTE — ED ATTENDING ATTESTATION
"4/29/2023  I, Mouna Gee MD, saw and evaluated the patient  I have discussed the patient with the resident/non-physician practitioner and agree with the resident's/non-physician practitioner's findings, Plan of Care, and MDM as documented in the resident's/non-physician practitioner's note, except where noted  All available labs and Radiology studies were reviewed  I was present for key portions of any procedure(s) performed by the resident/non-physician practitioner and I was immediately available to provide assistance  At this point I agree with the current assessment done in the Emergency Department  I have conducted an independent evaluation of this patient a history and physical is as follows:      Final Diagnosis:  1  Vaginal bleeding    2  Abnormal vaginal bleeding      Chief Complaint   Patient presents with    Vaginal Bleeding     Pt reports vaginal bleeding x 2 weeks  Pt reports over the last 3-4 days it has gotten heavier  Pt denies any urinary sxs  Pt admits to some feelings of being fatigue and weak  43-year-old female who presents for evaluation of 2 weeks of vaginal bleeding  Started out mild, however, worsened today  Feeling a little bit weak  Her last period was approximately 1 year ago  PMH:  -Hypertension  PSH:  -Not applicable    PE:   Vitals:    04/29/23 2232   BP: (!) 151/116   Pulse: 67   Resp: 18   Temp: 97 9 °F (36 6 °C)   TempSrc: Oral   SpO2: 98%   Weight: 115 kg (254 lb 6 6 oz)   Height: 5' 9\" (1 753 m)         Constitutional: Vital signs are normal  She appears well-developed  She is cooperative  No distress  Cardiovascular: Normal rate, regular rhythm  Pulmonary/Chest: Effort normal    Neurological: She is alert  Skin: Skin is warm, dry and intact  Psychiatric: She has a normal mood and affect  Her speech is normal and behavior is normal  Thought content normal       A:  -43-year-old female who presents with vaginal bleeding      P:  -Postmenopausal " bleeding versus dysmenorrhea versus cancer  CBC to evaluate for evidence of anemia  Patient instructed on the importance of follow-up with gynecology to rule out cancer as a cause of vaginal bleeding     - 13 point ROS was performed and all are normal unless stated in the history above  - Nursing note reviewed  Vitals reviewed  - Orders placed by myself and/or advanced practitioner / resident     - Previous chart was reviewed  - No language barrier    - History obtained from patient  - There are no limitations to the history obtained  - Critical care time: Not applicable for this patient            Medications - No data to display  No orders to display     Orders Placed This Encounter   Procedures    CBC and differential    hCG, qualitative pregnancy     Labs Reviewed   CBC AND DIFFERENTIAL - Abnormal       Result Value Ref Range Status    WBC 5 17  4 31 - 10 16 Thousand/uL Final    RBC 5 02  3 81 - 5 12 Million/uL Final    Hemoglobin 13 4  11 5 - 15 4 g/dL Final    Hematocrit 43 6  34 8 - 46 1 % Final    MCV 87  82 - 98 fL Final    MCH 26 7 (*) 26 8 - 34 3 pg Final    MCHC 30 7 (*) 31 4 - 37 4 g/dL Final    RDW 15 3 (*) 11 6 - 15 1 % Final    MPV 9 0  8 9 - 12 7 fL Final    Platelets 691  284 - 390 Thousands/uL Final    nRBC 0  /100 WBCs Final    Neutrophils Relative 61  43 - 75 % Final    Immat GRANS % 0  0 - 2 % Final    Lymphocytes Relative 27  14 - 44 % Final    Monocytes Relative 9  4 - 12 % Final    Eosinophils Relative 2  0 - 6 % Final    Basophils Relative 1  0 - 1 % Final    Neutrophils Absolute 3 23  1 85 - 7 62 Thousands/µL Final    Immature Grans Absolute 0 01  0 00 - 0 20 Thousand/uL Final    Lymphocytes Absolute 1 37  0 60 - 4 47 Thousands/µL Final    Monocytes Absolute 0 44  0 17 - 1 22 Thousand/µL Final    Eosinophils Absolute 0 09  0 00 - 0 61 Thousand/µL Final    Basophils Absolute 0 03  0 00 - 0 10 Thousands/µL Final   PREGNANCY TEST (HCG QUALITATIVE) - Normal    Preg, Serum Negative Negative Final     Time reflects when diagnosis was documented in both MDM as applicable and the Disposition within this note     Time User Action Codes Description Comment    4/29/2023 11:22 PM Kristin Gonzalezas Add [N93 9] Vaginal bleeding     4/29/2023 11:22 PM Kristin Cunas Add [N93 9] Abnormal vaginal bleeding       ED Disposition     ED Disposition   Discharge    Condition   Stable    Date/Time   Sat Apr 29, 2023 11:39 PM    Wasseryamilet 9 discharge to home/self care  Follow-up Information     Follow up With Specialties Details Why Contact Info Additional [address] [facility] Obstetrics and Gynecology  For Emergency Department Follow-up [address]  [place] [address] [facility] [address]  [address], [address] [place], [place], [address] [place] [address] Emergency Department Emergency Medicine  If symptoms worsen [facility] [address]  [address] [place] [address] Emergency Department, [address] [place] [address] , [place], [place], [address]        Patient's Medications   Discharge Prescriptions    No medications on file     No discharge procedures on file  Prior to Admission Medications   Prescriptions Last Dose Informant Patient Reported?  Taking?   acetaminophen (TYLENOL) 325 mg tablet   No No   Sig: Take 2 tablets (650 mg total) by mouth every 6 (six) hours as needed for moderate pain   Patient not taking: Reported on 9/21/2022   acetaminophen-codeine (TYLENOL #3) 300-30 mg per tablet   No No   Sig: Take 1 tablet by mouth every 4 (four) hours as needed for moderate pain   Patient not taking: Reported on 10/31/2022   amLODIPine (NORVASC) 2 5 mg tablet 4/28/2023  Yes Yes   Sig: Take 2 5 mg by mouth daily   ibuprofen (MOTRIN) 600 mg tablet   No No   Sig: Take 1 tablet (600 mg total) by mouth every 6 (six) hours as "needed for mild pain   Patient not taking: Reported on 9/21/2022   naproxen (EC NAPROSYN) 500 MG EC tablet   No No   Sig: Take 1 tablet (500 mg total) by mouth 2 (two) times a day with meals   Patient not taking: Reported on 9/21/2022      Facility-Administered Medications: None       Portions of the record may have been created with voice recognition software  Occasional wrong word or \"sound a like\" substitutions may have occurred due to the inherent limitations of voice recognition software  Read the chart carefully and recognize, using context, where substitutions have occurred        ED Course         Critical Care Time  Procedures      "

## 2023-11-09 NOTE — PROGRESS NOTES
Ortho Sports Medicine  Hand/Wrist Visit Follow Up      Assesment:   65 y o  female left healing distal radius fracture with underlying wrist osteoarthritis    Plan:  The patient's diagnosis and treatment were discussed at length today  We discussed no treatment, non-operative treatment, and operative treatment  The patient's findings and exam are consistent with healing left distal radius fracture with secondary osteoarthritis  I discussed with her that her wrist continues to heal with appropriate alignment  She should continue to take over-the-counter medication as needed  She is able to perform activity as tolerated  She should continue with outpatient physical therapy with emphasis on manual techniques to improve range of motion  I discussed with her that she is experiencing some stiffness and I did provide her with a referral to a hand specialist if she would like to seek further treatment if her symptoms do not improve  No follow-up with me as needed as her fractured is healed    Conservative treatment:    Ice to hand/wrist 1-2 times daily, for 20 minutes at a time  Home exercise program for hand, wrist and forearm including ROM and strenthening  Instructions given to patient of what exercises to perform  Let pain guide return to activities  Continue home exercise program and outpatient physical therapy with emphasis on manual techniques for improvement of range of motion  Referral to orthopedic hand specialist was provided during today's visit  Imaging: All imaging from today was reviewed by myself and explained to the patient  Injection:    No Injection planned at this time  Surgery:     No surgery is recommended at this point, continue with conservative treatment plan as noted  Follow up:    No follow-ups on file  Chief Complaint   Patient presents with   • Left Wrist - Follow-up       History of Present Illness:     The patient is a 65 y o , follow-up evaluation of radius fracture sustained on 9/16/2022  She states that she is overall doing well, however she does report occasional discomfort in the lateral aspect of her wrist   She states that she has been compliant with PT/OT, however she does report significant stiffness with wrist flexion and extension  She states that she is gradually seeing some progression in her range of motion, but not as much as she would like  She states that she has been back to work without any restrictions or issues or complications  She denies any new injury or trauma to her wrist   She denies any numbness or tingling  Hand/wrist Surgical History:  None    Past Medical, Social and Family History:  Past Medical History:   Diagnosis Date   • Hypertension      Past Surgical History:   Procedure Laterality Date   • HERNIA REPAIR     • HERNIA REPAIR       No Known Allergies  Current Outpatient Medications on File Prior to Visit   Medication Sig Dispense Refill   • amLODIPine (NORVASC) 2 5 mg tablet Take 2 5 mg by mouth daily     • acetaminophen (TYLENOL) 325 mg tablet Take 2 tablets (650 mg total) by mouth every 6 (six) hours as needed for moderate pain (Patient not taking: Reported on 9/21/2022) 30 tablet 0   • acetaminophen-codeine (TYLENOL #3) 300-30 mg per tablet Take 1 tablet by mouth every 4 (four) hours as needed for moderate pain (Patient not taking: Reported on 10/31/2022) 15 tablet 0   • ibuprofen (MOTRIN) 600 mg tablet Take 1 tablet (600 mg total) by mouth every 6 (six) hours as needed for mild pain (Patient not taking: Reported on 9/21/2022) 30 tablet 0   • naproxen (EC NAPROSYN) 500 MG EC tablet Take 1 tablet (500 mg total) by mouth 2 (two) times a day with meals (Patient not taking: Reported on 9/21/2022) 10 tablet 0     No current facility-administered medications on file prior to visit       Social History     Socioeconomic History   • Marital status: Single     Spouse name: Not on file   • Number of children: Not on file   • 09-Nov-2023 14:58 Years of education: Not on file   • Highest education level: Not on file   Occupational History   • Not on file   Tobacco Use   • Smoking status: Every Day     Packs/day: 0 25     Types: Cigarettes   • Smokeless tobacco: Never   Vaping Use   • Vaping Use: Never used   Substance and Sexual Activity   • Alcohol use: Yes     Comment: occasionally    • Drug use: Never   • Sexual activity: Not on file   Other Topics Concern   • Not on file   Social History Narrative   • Not on file     Social Determinants of Health     Financial Resource Strain: Not on file   Food Insecurity: Not on file   Transportation Needs: Not on file   Physical Activity: Not on file   Stress: Not on file   Social Connections: Not on file   Intimate Partner Violence: Not on file   Housing Stability: Not on file         I have reviewed the past medical, surgical, social and family history, medications and allergies as documented in the EMR  Review of systems: ROS is negative other than that noted in the HPI  Constitutional: Negative for fatigue and fever  HENT: Negative for sore throat  Respiratory: Negative for shortness of breath  Cardiovascular: Negative for chest pain  Gastrointestinal: Negative for abdominal pain  Endocrine: Negative for cold intolerance and heat intolerance  Genitourinary: Negative for flank pain  Musculoskeletal: Negative for back pain  Skin: Negative for rash  Allergic/Immunologic: Negative for immunocompromised state  Neurological: Negative for dizziness  Psychiatric/Behavioral: Negative for agitation  Physical Exam:    Blood pressure (!) 147/108, pulse 84, height 5' 9" (1 753 m), weight 112 kg (246 lb)      General/Constitutional: NAD, well developed, well nourished  HENT: Normocephalic, atraumatic  CV: Intact distal pulses, regular rate  Resp: No respiratory distress or labored breathing  Lymphatic: No lymphadenopathy palpated  Neuro: Alert and Oriented x 3, no focal deficits  Psych: Normal mood, normal affect, normal judgement, normal behavior  Skin: Warm, dry, no rashes, no erythema      Wrist/Hand focused exam:                RIGHT LEFT   ROM:   full Decreased flexion, decreased extension   Palpation: Effusion negative negative     Tenderness none  distal radius   Instability:  stable stable   Special Tests: Phalen Negative Negative     TFCC grind Negative Negative     Piano key sign Negative Negative     Sandoval not tested not tested   Other:        UE NV Exam: +2 Radial pulses bilaterally  Sensation intact to light touch C5-T1 bilaterally, Radial/median/ulnar nerve motor intact      Bilateral shoulder, elbow, and forearm ROM full, painless with passive ROM, no ttp or crepitance throughout extremities above wrist joint    Cervical ROM is full without pain, numbness or tingling    Negative spurling maneuver bilaterally      Hand/Wrist Imaging:    X-rays of the left hand/wrist were reviewed, which demonstrate healing distal radius fracture  There is degenerative changes noted  I have reviewed the radiology report and do not currently have a radiology reading from [facility], but will check the result once the reading is performed      Scribe Attestation    I,:  Loretta Nieto am acting as a scribe while in the presence of the attending physician :       I,:  Griselda Salmon, DO personally performed the services described in this documentation    as scribed in my presence :

## 2025-08-16 ENCOUNTER — APPOINTMENT (EMERGENCY)
Dept: CT IMAGING | Facility: HOSPITAL | Age: 60
End: 2025-08-16

## 2025-08-16 ENCOUNTER — HOSPITAL ENCOUNTER (EMERGENCY)
Facility: HOSPITAL | Age: 60
Discharge: HOME | End: 2025-08-16
Attending: INTERNAL MEDICINE | Admitting: INTERNAL MEDICINE

## 2025-08-16 VITALS
TEMPERATURE: 99 F | DIASTOLIC BLOOD PRESSURE: 72 MMHG | HEART RATE: 67 BPM | RESPIRATION RATE: 20 BRPM | OXYGEN SATURATION: 96 % | SYSTOLIC BLOOD PRESSURE: 138 MMHG

## 2025-08-16 DIAGNOSIS — R51.9 HEADACHE: Primary | ICD-10-CM

## 2025-08-16 LAB
ANION GAP SERPL CALCULATED.3IONS-SCNC: 7 MMOL/L (ref 4–13)
BASOPHILS # BLD AUTO: 0.02 THOUSANDS/ÂΜL (ref 0–0.1)
BASOPHILS NFR BLD AUTO: 0 % (ref 0–1)
BUN SERPL-MCNC: 15 MG/DL (ref 5–25)
CALCIUM SERPL-MCNC: 9.5 MG/DL (ref 8.4–10.2)
CHLORIDE SERPL-SCNC: 105 MMOL/L (ref 96–108)
CO2 SERPL-SCNC: 27 MMOL/L (ref 21–32)
CREAT SERPL-MCNC: 0.79 MG/DL (ref 0.6–1.3)
EOSINOPHIL # BLD AUTO: 0.09 THOUSAND/ÂΜL (ref 0–0.61)
EOSINOPHIL NFR BLD AUTO: 1 % (ref 0–6)
ERYTHROCYTE [DISTWIDTH] IN BLOOD BY AUTOMATED COUNT: 14.8 % (ref 11.6–15.1)
GFR SERPL CREATININE-BSD FRML MDRD: 82 ML/MIN/1.73SQ M
GLUCOSE SERPL-MCNC: 100 MG/DL (ref 65–140)
HCT VFR BLD AUTO: 45.3 % (ref 34.8–46.1)
HGB BLD-MCNC: 13.7 G/DL (ref 11.5–15.4)
IMM GRANULOCYTES # BLD AUTO: 0.03 THOUSAND/UL (ref 0–0.2)
IMM GRANULOCYTES NFR BLD AUTO: 0 % (ref 0–2)
LYMPHOCYTES # BLD AUTO: 1.78 THOUSANDS/ÂΜL (ref 0.6–4.47)
LYMPHOCYTES NFR BLD AUTO: 22 % (ref 14–44)
MCH RBC QN AUTO: 26.2 PG (ref 26.8–34.3)
MCHC RBC AUTO-ENTMCNC: 30.2 G/DL (ref 31.4–37.4)
MCV RBC AUTO: 87 FL (ref 82–98)
MONOCYTES # BLD AUTO: 0.62 THOUSAND/ÂΜL (ref 0.17–1.22)
MONOCYTES NFR BLD AUTO: 8 % (ref 4–12)
NEUTROPHILS # BLD AUTO: 5.44 THOUSANDS/ÂΜL (ref 1.85–7.62)
NEUTS SEG NFR BLD AUTO: 69 % (ref 43–75)
NRBC BLD AUTO-RTO: 0 /100 WBCS
PLATELET # BLD AUTO: 266 THOUSANDS/UL (ref 149–390)
PMV BLD AUTO: 9.3 FL (ref 8.9–12.7)
POTASSIUM SERPL-SCNC: 3.7 MMOL/L (ref 3.5–5.3)
RBC # BLD AUTO: 5.23 MILLION/UL (ref 3.81–5.12)
SODIUM SERPL-SCNC: 139 MMOL/L (ref 135–147)
WBC # BLD AUTO: 7.98 THOUSAND/UL (ref 4.31–10.16)

## 2025-08-16 PROCEDURE — 96374 THER/PROPH/DIAG INJ IV PUSH: CPT

## 2025-08-16 PROCEDURE — 99283 EMERGENCY DEPT VISIT LOW MDM: CPT

## 2025-08-16 PROCEDURE — 36415 COLL VENOUS BLD VENIPUNCTURE: CPT | Performed by: INTERNAL MEDICINE

## 2025-08-16 PROCEDURE — 96361 HYDRATE IV INFUSION ADD-ON: CPT

## 2025-08-16 PROCEDURE — 99284 EMERGENCY DEPT VISIT MOD MDM: CPT | Performed by: INTERNAL MEDICINE

## 2025-08-16 PROCEDURE — 80048 BASIC METABOLIC PNL TOTAL CA: CPT | Performed by: INTERNAL MEDICINE

## 2025-08-16 PROCEDURE — 96375 TX/PRO/DX INJ NEW DRUG ADDON: CPT

## 2025-08-16 PROCEDURE — 85025 COMPLETE CBC W/AUTO DIFF WBC: CPT | Performed by: INTERNAL MEDICINE

## 2025-08-16 PROCEDURE — 70450 CT HEAD/BRAIN W/O DYE: CPT

## 2025-08-16 RX ORDER — IBUPROFEN 600 MG/1
600 TABLET, FILM COATED ORAL EVERY 6 HOURS PRN
Qty: 30 TABLET | Refills: 0 | Status: SHIPPED | OUTPATIENT
Start: 2025-08-16

## 2025-08-16 RX ORDER — KETOROLAC TROMETHAMINE 30 MG/ML
15 INJECTION, SOLUTION INTRAMUSCULAR; INTRAVENOUS ONCE
Status: COMPLETED | OUTPATIENT
Start: 2025-08-16 | End: 2025-08-16

## 2025-08-16 RX ORDER — DIPHENHYDRAMINE HYDROCHLORIDE 50 MG/ML
25 INJECTION, SOLUTION INTRAMUSCULAR; INTRAVENOUS ONCE
Status: COMPLETED | OUTPATIENT
Start: 2025-08-16 | End: 2025-08-16

## 2025-08-16 RX ORDER — METOCLOPRAMIDE HYDROCHLORIDE 5 MG/ML
10 INJECTION INTRAMUSCULAR; INTRAVENOUS ONCE
Status: COMPLETED | OUTPATIENT
Start: 2025-08-16 | End: 2025-08-16

## 2025-08-16 RX ORDER — SODIUM CHLORIDE, SODIUM LACTATE, POTASSIUM CHLORIDE, CALCIUM CHLORIDE 600; 310; 30; 20 MG/100ML; MG/100ML; MG/100ML; MG/100ML
125 INJECTION, SOLUTION INTRAVENOUS CONTINUOUS
Status: DISCONTINUED | OUTPATIENT
Start: 2025-08-16 | End: 2025-08-16 | Stop reason: HOSPADM

## 2025-08-16 RX ADMIN — KETOROLAC TROMETHAMINE 15 MG: 30 INJECTION, SOLUTION INTRAMUSCULAR at 17:36

## 2025-08-16 RX ADMIN — DIPHENHYDRAMINE HYDROCHLORIDE 25 MG: 50 INJECTION, SOLUTION INTRAMUSCULAR; INTRAVENOUS at 16:35

## 2025-08-16 RX ADMIN — SODIUM CHLORIDE, SODIUM LACTATE, POTASSIUM CHLORIDE, AND CALCIUM CHLORIDE 125 ML/HR: .6; .31; .03; .02 INJECTION, SOLUTION INTRAVENOUS at 16:35

## 2025-08-16 RX ADMIN — METOCLOPRAMIDE 10 MG: 5 INJECTION, SOLUTION INTRAMUSCULAR; INTRAVENOUS at 16:37
